# Patient Record
Sex: MALE | Race: OTHER | HISPANIC OR LATINO
[De-identification: names, ages, dates, MRNs, and addresses within clinical notes are randomized per-mention and may not be internally consistent; named-entity substitution may affect disease eponyms.]

---

## 2024-04-17 PROBLEM — Z00.00 ENCOUNTER FOR PREVENTIVE HEALTH EXAMINATION: Status: ACTIVE | Noted: 2024-04-17

## 2024-05-03 ENCOUNTER — APPOINTMENT (OUTPATIENT)
Dept: VASCULAR SURGERY | Facility: CLINIC | Age: 44
End: 2024-05-03
Payer: COMMERCIAL

## 2024-05-03 ENCOUNTER — TRANSCRIPTION ENCOUNTER (OUTPATIENT)
Age: 44
End: 2024-05-03

## 2024-05-03 VITALS
HEIGHT: 68 IN | WEIGHT: 230 LBS | SYSTOLIC BLOOD PRESSURE: 137 MMHG | DIASTOLIC BLOOD PRESSURE: 85 MMHG | HEART RATE: 83 BPM | BODY MASS INDEX: 34.86 KG/M2

## 2024-05-03 DIAGNOSIS — E78.00 PURE HYPERCHOLESTEROLEMIA, UNSPECIFIED: ICD-10-CM

## 2024-05-03 DIAGNOSIS — E11.9 TYPE 2 DIABETES MELLITUS W/OUT COMPLICATIONS: ICD-10-CM

## 2024-05-03 DIAGNOSIS — I10 ESSENTIAL (PRIMARY) HYPERTENSION: ICD-10-CM

## 2024-05-03 DIAGNOSIS — Z78.9 OTHER SPECIFIED HEALTH STATUS: ICD-10-CM

## 2024-05-03 PROCEDURE — 93986 DUP-SCAN HEMO COMPL UNI STD: CPT

## 2024-05-03 PROCEDURE — 99204 OFFICE O/P NEW MOD 45 MIN: CPT | Mod: 25

## 2024-05-03 PROCEDURE — G0506: CPT

## 2024-05-05 NOTE — HISTORY OF PRESENT ILLNESS
[FreeTextEntry1] : Patient is a 43-year-old gentleman with past medical history significant for diabetes, hypertension, renal insufficiency, currently not on dialysis presenting to us for evaluation for permanent hemodialysis access.  Patient denies any history of coronary artery disease or smoking.  Patient is right-handed.  Patient will need dialysis in the near future within 3 to 6 months.

## 2024-05-05 NOTE — PHYSICAL EXAM
[Hand well perfused] : hand well perfused [Normal] : normoactive bowel sounds, soft and nontender, no hepatosplenomegaly or masses appreciated [2+] : left 2+

## 2024-05-05 NOTE — ASSESSMENT
[FreeTextEntry1] : Patient with renal insufficiency.  Plan for left radiocephalic fistula.  Patient needs medical clearance.  Risks benefits and alternative modes of treatment were all discussed with the patient in detail.  Including risk of infection bleeding and steal syndrome.

## 2024-05-08 ENCOUNTER — APPOINTMENT (OUTPATIENT)
Dept: TRANSPLANT | Facility: CLINIC | Age: 44
End: 2024-05-08
Payer: COMMERCIAL

## 2024-05-08 ENCOUNTER — NON-APPOINTMENT (OUTPATIENT)
Age: 44
End: 2024-05-08

## 2024-05-08 ENCOUNTER — APPOINTMENT (OUTPATIENT)
Dept: NEPHROLOGY | Facility: CLINIC | Age: 44
End: 2024-05-08
Payer: COMMERCIAL

## 2024-05-08 VITALS
WEIGHT: 234 LBS | DIASTOLIC BLOOD PRESSURE: 86 MMHG | HEART RATE: 84 BPM | TEMPERATURE: 98.3 F | OXYGEN SATURATION: 99 % | RESPIRATION RATE: 16 BRPM | SYSTOLIC BLOOD PRESSURE: 141 MMHG | BODY MASS INDEX: 35.58 KG/M2

## 2024-05-08 VITALS
BODY MASS INDEX: 35.46 KG/M2 | HEART RATE: 84 BPM | WEIGHT: 234 LBS | SYSTOLIC BLOOD PRESSURE: 141 MMHG | DIASTOLIC BLOOD PRESSURE: 86 MMHG | OXYGEN SATURATION: 99 % | TEMPERATURE: 98.3 F | HEIGHT: 68 IN | RESPIRATION RATE: 16 BRPM

## 2024-05-08 DIAGNOSIS — N18.5 CHRONIC KIDNEY DISEASE, STAGE 5: ICD-10-CM

## 2024-05-08 DIAGNOSIS — Z79.4 TYPE 2 DIABETES MELLITUS WITH DIABETIC NEPHROPATHY: ICD-10-CM

## 2024-05-08 DIAGNOSIS — N18.6 HYPERTENSIVE CHRONIC KIDNEY DISEASE WITH STAGE 5 CHRONIC KIDNEY DISEASE OR END STAGE RENAL DISEASE: ICD-10-CM

## 2024-05-08 DIAGNOSIS — N18.9 CHRONIC KIDNEY DISEASE, UNSPECIFIED: ICD-10-CM

## 2024-05-08 DIAGNOSIS — E11.21 TYPE 2 DIABETES MELLITUS WITH DIABETIC NEPHROPATHY: ICD-10-CM

## 2024-05-08 DIAGNOSIS — I12.0 HYPERTENSIVE CHRONIC KIDNEY DISEASE WITH STAGE 5 CHRONIC KIDNEY DISEASE OR END STAGE RENAL DISEASE: ICD-10-CM

## 2024-05-08 PROCEDURE — 99204 OFFICE O/P NEW MOD 45 MIN: CPT

## 2024-05-08 PROCEDURE — 99205 OFFICE O/P NEW HI 60 MIN: CPT

## 2024-05-08 RX ORDER — FUROSEMIDE 80 MG/1
80 TABLET ORAL DAILY
Refills: 0 | Status: ACTIVE | COMMUNITY

## 2024-05-08 RX ORDER — DULAGLUTIDE 0.75 MG/.5ML
0.75 INJECTION, SOLUTION SUBCUTANEOUS WEEKLY
Refills: 0 | Status: ACTIVE | COMMUNITY

## 2024-05-08 RX ORDER — INSULIN DEGLUDEC INJECTION 100 U/ML
INJECTION, SOLUTION SUBCUTANEOUS DAILY
Refills: 0 | Status: ACTIVE | COMMUNITY

## 2024-05-08 RX ORDER — CALCITRIOL 0.5 UG/1
0.5 CAPSULE, LIQUID FILLED ORAL DAILY
Refills: 0 | Status: ACTIVE | COMMUNITY

## 2024-05-08 RX ORDER — SEVELAMER HYDROCHLORIDE 800 MG/1
800 TABLET, FILM COATED ORAL DAILY
Refills: 0 | Status: ACTIVE | COMMUNITY

## 2024-05-08 RX ORDER — SODIUM ZIRCONIUM CYCLOSILICATE 10 G/10G
10 POWDER, FOR SUSPENSION ORAL DAILY
Refills: 0 | Status: ACTIVE | COMMUNITY

## 2024-05-08 RX ORDER — INSULIN LISPRO 100 [IU]/ML
INJECTION, SOLUTION INTRAVENOUS; SUBCUTANEOUS
Refills: 0 | Status: ACTIVE | COMMUNITY

## 2024-05-08 RX ORDER — ENALAPRIL MALEATE 20 MG/1
20 TABLET ORAL DAILY
Refills: 0 | Status: ACTIVE | COMMUNITY

## 2024-05-08 RX ORDER — SODIUM BICARBONATE 650 MG/1
TABLET ORAL 3 TIMES DAILY
Refills: 0 | Status: ACTIVE | COMMUNITY

## 2024-05-08 RX ORDER — ATORVASTATIN CALCIUM 80 MG/1
80 TABLET, FILM COATED ORAL
Refills: 0 | Status: ACTIVE | COMMUNITY

## 2024-05-08 RX ORDER — CALCIUM ACETATE 667 MG/1
667 CAPSULE ORAL 3 TIMES DAILY
Refills: 0 | Status: ACTIVE | COMMUNITY

## 2024-05-08 NOTE — ASSESSMENT
[FreeTextEntry1] : .Mr. TUCKER 43 year He is evaluated for kidney transplantation. Pre transplant/CKD: Patient will benefit from renal allotransplantation once confirmed to be a candidate after full evaluation that includes review of reports of risk evaluation noted below. Patient  is an acceptable candidate clinically with the following risks needing full evaluation before transplant candidacy can be confirmed: Cardiovascular, cancer screening, Infectious disease screening, Vascular/urinary tract anatomy, Psychosocial factors. Diabetes Mellitus: Discussed implications. Continue follow up with primary physicians Hypertension: Discussed implications. Continue follow up with primary physicians. Cardiac risk:  will get further evaluation; echo, stress test; Reviewed cardiovascular risk evaluation process Cancer screening:  Reviewed for any h/o neoplastic diseases. Appropriate cancer screening include: PSA/Colon leon screening. ID: Serology for acute and chronic viral infections/immunity/screening for latent TB  Imaging: Renal/abdominal /chest /Iliac/ carotids imaging Consults: Nutrition, social work, cardiology, Transplant surgery. Reviewed factors affecting survival and morbidity while on wait list and reviewed raegan-operative and long-term risk factors affecting outcome in kidney transplantation. Details of transplant surgery, immunosuppression and its complications and benefits of live donor transplantation as well as variability in wait times across regions and multiple listing were discussed. KDPI >85% and PHS high risk criteria donors were discussed. Discussed factors affecting morbidity and mortality while on hemodialysis. Current outcome for [facility] kidney transplant program and SRTR data were discussed. He has informative educational video and power point presentation regarding details of kidney transplantation at this center. Patient has potential live donor (none ) at present.  Will proceed with completing/ updating work up and listing for transplant/ live donor transplant once work up is reviewed and found to be ok. Copy of Consult/Note sent to referring nephrologist/Primary provider. Any work up obtained for transplant evaluation to be sent to patient's team of care providers as appropriate.

## 2024-05-08 NOTE — HISTORY OF PRESENT ILLNESS
[FreeTextEntry1] : 43  years old  male, born in [facility] , living in  since    Patient has known CKD  (),    on follow up with Dr. Almeida is here for pre kidney transplant evaluation. Kidney disease is known to be from: HTN, DM type 2 Kidney Biopsy if available is reviewed. None   Patient is a preemptive candidate. Patient is accompanied by mother- Soniya today.   MEDICAL Hx:   Patient has known DM (age  33) On insulin (age 38); HTN (age 44). Reviewed for h/o Hyperlipidemia/ Gout Reviewed for history of CAD/PCI/Pacemaker/AICD- none Reviewed for h/o chronic liver/lung disease-none No known h/o kidney stone/ Prostatism/urinary obstruction/ ureter stents/hematuria. No Transfusions Urine out put: Nocturia X3-4 times No h/o Sleep apnea. No h/o Thyroid disease. No h/o NSAID/pain medication use. No major weight loss/weight loss surgeries Has no h/o Pneumonia / UTI/ known h/o tuberculosis or hepatitis/active infections/open wounds. No known h/o active CAD/CVA/PVD/DVT/bleeding/falls/seizures/psych issues/neoplasia   COVID/COVID vaccination: 4 times COVID, recovered at home; Vaccinated   Most recent hospitalization/for: None recent   SURGICAL Hx: Appendectomy  Circumcision in  Penile implant   No history of kidney/ bladder/ prostate surgery.   PERSONAL/FAMILY Hx: Non smoker. Family: Lives with: 15 years old Niece and mother Care giver post op: Parents are . Father -    Mother- Siblings- 1 brother in [place] healthy. Children: 1 13 years old daughter, lives in [place] Healthy. No family history of kidney disease Independent for ADL Able to walk ten blocks, can climb stairs without difficulty. Driving: Ok ROS: Has h/o shortness of breath on exertion. Vision: Ok Hearing: Ok Functional/employment status:  at [employer]  Potential Live donors: None   Prior Studies: Cardiology: none Cancer Screen: none Primary MD: Ej owen Nephrologist: Dr. Almeida     Prior Transplant evaluation: none prior Allergies: NKDA Medications: Does not take any Coumadin/eliquis/Plavix/Brilinta. Enalapril Atorvastatin 80/d Sod bicarb Furosemide 80/d Sevelamer 800/d Calcitriol 0.5 mcg/d Carvedilol 25/d Lokelma 10g/d Ca acetate Trulicity 0.75/week Tresiba  Humalog   Available lab data:  3/21 Hb 8.7 creatinine 6.64 eGFR 10 Na 139 K 5.1 CO2-16 BUN 75 Ca 7.9

## 2024-05-08 NOTE — PHYSICAL EXAM
[General Appearance - Alert] : alert [General Appearance - In No Acute Distress] : in no acute distress [Sclera] : the sclera and conjunctiva were normal [Outer Ear] : the ears and nose were normal in appearance [Jugular Venous Distention Increased] : there was no jugular-venous distention [Auscultation Breath Sounds / Voice Sounds] : lungs were clear to auscultation bilaterally [Heart Sounds Gallop] : no gallops [Heart Sounds Pericardial Friction Rub] : no pericardial rub [Edema] : there was no peripheral edema [Abdomen Soft] : soft [Abdomen Tenderness] : non-tender [Cervical Lymph Nodes Enlarged Posterior Bilaterally] : posterior cervical [Cervical Lymph Nodes Enlarged Anterior Bilaterally] : anterior cervical [Involuntary Movements] : no involuntary movements were seen [] : no rash [No Focal Deficits] : no focal deficits [Oriented To Time, Place, And Person] : oriented to person, place, and time [Impaired Insight] : insight and judgment were intact

## 2024-05-09 LAB
ABO + RH PNL BLD: NORMAL
ABO + RH PNL BLD: NORMAL
ALBUMIN SERPL ELPH-MCNC: 3.7 G/DL
ALP BLD-CCNC: 76 U/L
ALT SERPL-CCNC: 15 U/L
ANION GAP SERPL CALC-SCNC: 14 MMOL/L
APPEARANCE: CLEAR
AST SERPL-CCNC: 18 U/L
BACTERIA: NEGATIVE /HPF
BASOPHILS # BLD AUTO: 0.04 K/UL
BASOPHILS NFR BLD AUTO: 0.7 %
BILIRUB SERPL-MCNC: 0.2 MG/DL
BILIRUBIN URINE: NEGATIVE
BLOOD URINE: ABNORMAL
BUN SERPL-MCNC: 89 MG/DL
C PEPTIDE SERPL-MCNC: 12 NG/ML
CALCIUM SERPL-MCNC: 8.6 MG/DL
CAST: 0 /LPF
CHLORIDE SERPL-SCNC: 105 MMOL/L
CHOLEST SERPL-MCNC: 160 MG/DL
CMV IGG SERPL QL: 2.5 U/ML
CMV IGG SERPL-IMP: POSITIVE
CO2 SERPL-SCNC: 17 MMOL/L
COLOR: YELLOW
COVID-19 NUCLEOCAPSID  GAM ANTIBODY INTERPRETATION: POSITIVE
COVID-19 SPIKE DOMAIN ANTIBODY INTERPRETATION: POSITIVE
CREAT SERPL-MCNC: 8.45 MG/DL
CREAT SPEC-SCNC: 59 MG/DL
CREAT/PROT UR: 9.9 RATIO
EBV EA AB SER IA-ACNC: <5 U/ML
EBV EA AB TITR SER IF: POSITIVE
EBV EA IGG SER QL IA: 66.5 U/ML
EBV EA IGG SER-ACNC: NEGATIVE
EBV EA IGM SER IA-ACNC: NEGATIVE
EBV PATRN SPEC IB-IMP: NORMAL
EBV VCA IGG SER IA-ACNC: >750 U/ML
EBV VCA IGM SER QL IA: <10 U/ML
EGFR: 7 ML/MIN/1.73M2
EOSINOPHIL # BLD AUTO: 0.48 K/UL
EOSINOPHIL NFR BLD AUTO: 8 %
EPITHELIAL CELLS: 1 /HPF
EPSTEIN-BARR VIRUS CAPSID ANTIGEN IGG: POSITIVE
ESTIMATED AVERAGE GLUCOSE: 160 MG/DL
GLUCOSE QUALITATIVE U: 100 MG/DL
GLUCOSE SERPL-MCNC: 101 MG/DL
HAV IGM SER QL: NONREACTIVE
HBA1C MFR BLD HPLC: 7.2 %
HBV CORE IGG+IGM SER QL: NONREACTIVE
HBV SURFACE AB SER QL: REACTIVE
HBV SURFACE AB SERPL IA-ACNC: 153.4 MIU/ML
HBV SURFACE AG SER QL: NONREACTIVE
HCT VFR BLD CALC: 28.4 %
HCV AB SER QL: NONREACTIVE
HCV S/CO RATIO: 0.06 S/CO
HDLC SERPL-MCNC: 37 MG/DL
HEPATITIS A IGG ANTIBODY: REACTIVE
HGB BLD-MCNC: 9.1 G/DL
HIV1+2 AB SPEC QL IA.RAPID: NONREACTIVE
HSV 1+2 IGG SER IA-IMP: NEGATIVE
HSV 1+2 IGG SER IA-IMP: POSITIVE
HSV1 IGG SER QL: 36.2 INDEX
HSV2 IGG SER QL: 0.24 INDEX
IMM GRANULOCYTES NFR BLD AUTO: 0.2 %
KETONES URINE: NEGATIVE MG/DL
LDLC SERPL CALC-MCNC: 77 MG/DL
LEUKOCYTE ESTERASE URINE: NEGATIVE
LYMPHOCYTES # BLD AUTO: 1.85 K/UL
LYMPHOCYTES NFR BLD AUTO: 30.9 %
MAGNESIUM SERPL-MCNC: 2.3 MG/DL
MAN DIFF?: NORMAL
MCHC RBC-ENTMCNC: 28.3 PG
MCHC RBC-ENTMCNC: 32 GM/DL
MCV RBC AUTO: 88.2 FL
MICROSCOPIC-UA: NORMAL
MONOCYTES # BLD AUTO: 0.44 K/UL
MONOCYTES NFR BLD AUTO: 7.4 %
NEUTROPHILS # BLD AUTO: 3.16 K/UL
NEUTROPHILS NFR BLD AUTO: 52.8 %
NITRITE URINE: NEGATIVE
NONHDLC SERPL-MCNC: 122 MG/DL
PH URINE: 6.5
PHOSPHATE SERPL-MCNC: 6.7 MG/DL
PLATELET # BLD AUTO: 219 K/UL
POTASSIUM SERPL-SCNC: 5.4 MMOL/L
PROT SERPL-MCNC: 6.4 G/DL
PROT UR-MCNC: 582 MG/DL
PROTEIN URINE: 300 MG/DL
PSA SERPL-MCNC: 0.68 NG/ML
RBC # BLD: 3.22 M/UL
RBC # FLD: 13.7 %
RED BLOOD CELLS URINE: 2 /HPF
RUBV IGG FLD-ACNC: 13.5 INDEX
RUBV IGG SER-IMP: POSITIVE
SARS-COV-2 AB SERPL IA-ACNC: >250 U/ML
SARS-COV-2 AB SERPL QL IA: 186 INDEX
SARS-COV-2 N GENE NPH QL NAA+PROBE: NOT DETECTED
SODIUM SERPL-SCNC: 135 MMOL/L
SPECIFIC GRAVITY URINE: 1.01
T GONDII AB SER-IMP: POSITIVE
T GONDII IGG SER QL: 16.4 IU/ML
T PALLIDUM AB SER QL IA: NEGATIVE
TRIGL SERPL-MCNC: 280 MG/DL
UROBILINOGEN URINE: 0.2 MG/DL
VZV AB TITR SER: NEGATIVE
VZV IGG SER IF-ACNC: 106.5 INDEX
WBC # FLD AUTO: 5.98 K/UL
WHITE BLOOD CELLS URINE: 1 /HPF

## 2024-05-12 ENCOUNTER — NON-APPOINTMENT (OUTPATIENT)
Age: 44
End: 2024-05-12

## 2024-05-14 ENCOUNTER — NON-APPOINTMENT (OUTPATIENT)
Age: 44
End: 2024-05-14

## 2024-05-14 LAB
M TB IFN-G BLD-IMP: POSITIVE
QUANTIFERON TB PLUS MITOGEN MINUS NIL: >10 IU/ML
QUANTIFERON TB PLUS NIL: 0.01 IU/ML
QUANTIFERON TB PLUS TB1 MINUS NIL: 9.37 IU/ML
QUANTIFERON TB PLUS TB2 MINUS NIL: >10 IU/ML

## 2024-05-20 ENCOUNTER — APPOINTMENT (OUTPATIENT)
Dept: CT IMAGING | Facility: CLINIC | Age: 44
End: 2024-05-20
Payer: COMMERCIAL

## 2024-05-20 ENCOUNTER — APPOINTMENT (OUTPATIENT)
Dept: RADIOLOGY | Facility: CLINIC | Age: 44
End: 2024-05-20
Payer: COMMERCIAL

## 2024-05-20 PROCEDURE — 71046 X-RAY EXAM CHEST 2 VIEWS: CPT

## 2024-05-20 PROCEDURE — 74176 CT ABD & PELVIS W/O CONTRAST: CPT

## 2024-05-21 PROBLEM — Z87.448 HISTORY OF END STAGE RENAL DISEASE: Status: RESOLVED | Noted: 2024-05-21 | Resolved: 2024-05-21

## 2024-05-22 ENCOUNTER — APPOINTMENT (OUTPATIENT)
Dept: ENDOVASCULAR SURGERY | Facility: CLINIC | Age: 44
End: 2024-05-22
Payer: COMMERCIAL

## 2024-05-22 ENCOUNTER — RESULT REVIEW (OUTPATIENT)
Age: 44
End: 2024-05-22

## 2024-05-22 VITALS
OXYGEN SATURATION: 100 % | WEIGHT: 230 LBS | RESPIRATION RATE: 18 BRPM | BODY MASS INDEX: 34.86 KG/M2 | DIASTOLIC BLOOD PRESSURE: 79 MMHG | HEIGHT: 68 IN | HEART RATE: 80 BPM | TEMPERATURE: 97.9 F | SYSTOLIC BLOOD PRESSURE: 142 MMHG

## 2024-05-22 DIAGNOSIS — Z87.448 PERSONAL HISTORY OF OTHER DISEASES OF URINARY SYSTEM: ICD-10-CM

## 2024-05-22 PROCEDURE — 36558 INSERT TUNNELED CV CATH: CPT

## 2024-05-22 PROCEDURE — 76937 US GUIDE VASCULAR ACCESS: CPT

## 2024-05-22 PROCEDURE — 99213 OFFICE O/P EST LOW 20 MIN: CPT | Mod: 25

## 2024-05-22 PROCEDURE — 77001 FLUOROGUIDE FOR VEIN DEVICE: CPT

## 2024-05-24 ENCOUNTER — OUTPATIENT (OUTPATIENT)
Dept: OUTPATIENT SERVICES | Facility: HOSPITAL | Age: 44
LOS: 1 days | End: 2024-05-24
Payer: COMMERCIAL

## 2024-05-24 VITALS — OXYGEN SATURATION: 100 % | WEIGHT: 240.08 LBS | TEMPERATURE: 98 F | RESPIRATION RATE: 18 BRPM | HEIGHT: 68 IN

## 2024-05-24 DIAGNOSIS — E78.5 HYPERLIPIDEMIA, UNSPECIFIED: ICD-10-CM

## 2024-05-24 DIAGNOSIS — I10 ESSENTIAL (PRIMARY) HYPERTENSION: ICD-10-CM

## 2024-05-24 DIAGNOSIS — I12.0 HYPERTENSIVE CHRONIC KIDNEY DISEASE WITH STAGE 5 CHRONIC KIDNEY DISEASE OR END STAGE RENAL DISEASE: ICD-10-CM

## 2024-05-24 DIAGNOSIS — Z99.2 DEPENDENCE ON RENAL DIALYSIS: Chronic | ICD-10-CM

## 2024-05-24 DIAGNOSIS — N18.6 END STAGE RENAL DISEASE: ICD-10-CM

## 2024-05-24 DIAGNOSIS — Z96.0 PRESENCE OF UROGENITAL IMPLANTS: Chronic | ICD-10-CM

## 2024-05-24 DIAGNOSIS — Z98.890 OTHER SPECIFIED POSTPROCEDURAL STATES: Chronic | ICD-10-CM

## 2024-05-24 DIAGNOSIS — Z90.49 ACQUIRED ABSENCE OF OTHER SPECIFIED PARTS OF DIGESTIVE TRACT: Chronic | ICD-10-CM

## 2024-05-24 DIAGNOSIS — E11.9 TYPE 2 DIABETES MELLITUS WITHOUT COMPLICATIONS: ICD-10-CM

## 2024-05-24 DIAGNOSIS — Z01.818 ENCOUNTER FOR OTHER PREPROCEDURAL EXAMINATION: ICD-10-CM

## 2024-05-24 NOTE — H&P PST ADULT - NSICDXPASTSURGICALHX_GEN_ALL_CORE_FT
PAST SURGICAL HISTORY:  History of appendectomy     History of circumcision     History of penile implant     Status post insertion of hemodialysis catheter

## 2024-05-24 NOTE — H&P PST ADULT - ASSESSMENT
Scheduled for left radiocephalic fistula creation on 6/6/24 44 y/o male with history of hypertension, diabetes, hyperlipidemia, latent tuberculosis, BMI 36, and ESRD (dx 11/2023) on hemodialysis since 05/20/2024 via right anterior chest wall vascath (Mon/Wed/Fri)  is scheduled for left radiocephalic fistula creation on 6/6/24    STOP BANG SCORE IS 3

## 2024-05-24 NOTE — H&P PST ADULT - PROBLEM SELECTOR PLAN 3
Take antihypertensive medications the morning of surgery with a sip of water  Follow up with PCP postoperatively for management of hypertension

## 2024-05-24 NOTE — H&P PST ADULT - PROBLEM SELECTOR PLAN 1
Scheduled for left radiocephalic fistula creation on 6/6/24  Preoperative instructions discussed and given to patient.   Patient agrees to follow up with surgeon's office for instructions prior to surgery   Discussed preprocedure skin preparation using  chlorhexidine gluconate 4% solution three days prior to  surgery - including the day of surgery  Instructed patient to avoid aspirin and aspirin products, over the counter medications such as vitamins and herbal medications, one week prior to surgery.  Take Tylenol as needed for pain  Follow up with PCP postoperatively for management of chronic conditions  Patient verbalized understanding of instructions Scheduled for left radiocephalic fistula creation on 6/6/24  Preoperative instructions discussed and given to patient.   Patient agrees to follow up with surgeon's office for instructions prior to surgery   Discussed preprocedure skin preparation using  chlorhexidine gluconate 4% solution three days prior to  surgery - including the day of surgery  Instructed patient to avoid aspirin and aspirin products, over the counter medications such as vitamins and herbal medications, one week prior to surgery.  Take Tylenol as needed for pain  Follow up with PCP postoperatively for management of chronic conditions  Patient verbalized understanding of instructions  Complete hemodialysis the day before surgery  MRSA/MSSA - here today  A1C on chart Scheduled for left radiocephalic fistula creation on 6/6/24  Preoperative instructions discussed and given to patient.   Patient agrees to follow up with surgeon's office for instructions prior to surgery   Discussed preprocedure skin preparation using  chlorhexidine gluconate 4% solution three days prior to  surgery - including the day of surgery  Instructed patient to avoid aspirin and aspirin products, over the counter medications such as vitamins and herbal medications, one week prior to surgery.  Take Tylenol as needed for pain  Follow up with PCP postoperatively for management of chronic conditions  Patient verbalized understanding of instructions  Complete hemodialysis the day before surgery  MRSA/MSSA - here today  A1C on chart  Labs/ekg/mc/cc pending in PST - pt has scheduled appointment prior to surgery and plans to keep appts as scheduled

## 2024-05-24 NOTE — H&P PST ADULT - HISTORY OF PRESENT ILLNESS
44 y/o male with history of hypertension, diabetes, hyperlipidemia, latent tuberculosis, and ESRD (dx 11/2023) on hemodialysis since 05/20/2024 via right anterior chest wall vascath (Mon/Wed/Fri) presents for presurgical evaluation. He is scheduled for left radiocephalic fistula creation on 6/6/24

## 2024-05-24 NOTE — H&P PST ADULT - NSICDXPASTMEDICALHX_GEN_ALL_CORE_FT
PAST MEDICAL HISTORY:  End-stage renal disease (ESRD)     GERD (gastroesophageal reflux disease)     HLD (hyperlipidemia)     Hypertension     Type 2 diabetes mellitus      PAST MEDICAL HISTORY:  End-stage renal disease (ESRD)     GERD (gastroesophageal reflux disease)     HLD (hyperlipidemia)     Hypertension     TB lung, latent     Type 2 diabetes mellitus

## 2024-05-24 NOTE — H&P PST ADULT - PROBLEM SELECTOR PLAN 4
Hold trulicity 1 week prior to surgery (5/25/24)  A1C 7.2%  POC Glucose the morning of surgery  Continue tresiba as discussed  Hold humalog the morning of surgery

## 2024-05-24 NOTE — H&P PST ADULT - RESPIRATORY
normal/clear to auscultation bilaterally/no wheezes/no rales/no rhonchi/no use of accessory muscles/no subcutaneous emphysema/airway patent/respirations non-labored

## 2024-05-24 NOTE — H&P PST ADULT - NSICDXFAMILYHX_GEN_ALL_CORE_FT
FAMILY HISTORY:  Father  Still living? No  FH: kidney disease, Age at diagnosis: Age Unknown  FH: type 2 diabetes, Age at diagnosis: Age Unknown  FHx: hypertension, Age at diagnosis: Age Unknown

## 2024-05-24 NOTE — H&P PST ADULT - GASTROINTESTINAL
soft/nontender/nondistended/normal active bowel sounds/no guarding/no rigidity/no organomegaly/no palpable cam/no masses palpable details…

## 2024-05-25 ENCOUNTER — LABORATORY RESULT (OUTPATIENT)
Age: 44
End: 2024-05-25

## 2024-05-25 LAB
MRSA PCR RESULT.: SIGNIFICANT CHANGE UP
S AUREUS DNA NOSE QL NAA+PROBE: SIGNIFICANT CHANGE UP

## 2024-05-28 PROCEDURE — 87640 STAPH A DNA AMP PROBE: CPT

## 2024-05-28 PROCEDURE — 87641 MR-STAPH DNA AMP PROBE: CPT

## 2024-05-28 PROCEDURE — G0463: CPT

## 2024-05-29 ENCOUNTER — NON-APPOINTMENT (OUTPATIENT)
Age: 44
End: 2024-05-29

## 2024-05-29 ENCOUNTER — APPOINTMENT (OUTPATIENT)
Dept: CARDIOLOGY | Facility: CLINIC | Age: 44
End: 2024-05-29
Payer: COMMERCIAL

## 2024-05-29 VITALS
OXYGEN SATURATION: 97 % | BODY MASS INDEX: 35.46 KG/M2 | HEART RATE: 92 BPM | WEIGHT: 234 LBS | SYSTOLIC BLOOD PRESSURE: 138 MMHG | HEIGHT: 68 IN | DIASTOLIC BLOOD PRESSURE: 80 MMHG

## 2024-05-29 DIAGNOSIS — Z01.818 ENCOUNTER FOR OTHER PREPROCEDURAL EXAMINATION: ICD-10-CM

## 2024-05-29 DIAGNOSIS — I10 ESSENTIAL (PRIMARY) HYPERTENSION: ICD-10-CM

## 2024-05-29 PROCEDURE — 99204 OFFICE O/P NEW MOD 45 MIN: CPT

## 2024-05-29 PROCEDURE — G2211 COMPLEX E/M VISIT ADD ON: CPT

## 2024-05-29 PROCEDURE — 93000 ELECTROCARDIOGRAM COMPLETE: CPT | Mod: NC

## 2024-05-29 RX ORDER — CARVEDILOL 25 MG/1
25 TABLET, FILM COATED ORAL
Qty: 60 | Refills: 2 | Status: ACTIVE | COMMUNITY

## 2024-05-30 PROBLEM — E78.5 HYPERLIPIDEMIA, UNSPECIFIED: Chronic | Status: ACTIVE | Noted: 2024-05-24

## 2024-05-30 PROBLEM — Z22.7 LATENT TUBERCULOSIS: Chronic | Status: ACTIVE | Noted: 2024-05-24

## 2024-05-30 PROBLEM — K21.9 GASTRO-ESOPHAGEAL REFLUX DISEASE WITHOUT ESOPHAGITIS: Chronic | Status: ACTIVE | Noted: 2024-05-24

## 2024-05-30 PROBLEM — N18.6 END STAGE RENAL DISEASE: Chronic | Status: ACTIVE | Noted: 2024-05-24

## 2024-05-30 PROBLEM — I10 ESSENTIAL (PRIMARY) HYPERTENSION: Chronic | Status: ACTIVE | Noted: 2024-05-24

## 2024-05-30 PROBLEM — E11.9 TYPE 2 DIABETES MELLITUS WITHOUT COMPLICATIONS: Chronic | Status: ACTIVE | Noted: 2024-05-24

## 2024-05-30 NOTE — REASON FOR VISIT
[FreeTextEntry1] : needs left radiocephalic fistula. 2024 Surgery with Zach Alonso.   no biopsy, on HD - at [facility] via left chest catheter.   DM2, diagnosed 13 years ago HTN diagnosed 3 years ago HLD never a smoker  Surgical history includes appendectomy and circumcision, penile implant.  His father is . He had ESRD with renal transplant,  during Covid.  His mother is alive and well.  He has one brother. He has 2 half-sisters.  Born in [facility].  Single. He has one daughter, age 14.  He works at a car dealership.  For exercise he uses a stationary bicycle for one hour 3 times a week.  No chest discomfort, shortness of breath, palpitations, lightheadedness or syncope.      [Other: ____] : [unfilled] [Other: ______] : provided by [employer]ENVEN[employer]O

## 2024-05-31 NOTE — PAST MEDICAL HISTORY
[Increasing age ( >40 years old)] : Increasing age ( >40 years old) [No therapy indicated for cases scheduled for less than one hour] : No therapy indicated for cases scheduled for less than one hour. [FreeTextEntry1] : Malignant Hyperthermis (MH) Screening Tool and Risk of Bleeding Assessement Mr. FE TUCKER  denies family history of unexpected death following Anesthesia or Exercise. Denies Family history of Malignant Hyperthermia, Muscle or Neuromuscular disorder and High Temperature following exercise.  Mr. FE TUCKER denies history of Muscle Spasm, Dark or Chocolate - Colored urine and Unanticipated fever immediately following anesthesia or serious exercise.  Mr. TUCKER  also denies bleeding tendencies/ Risks of Bleeding

## 2024-05-31 NOTE — HISTORY OF PRESENT ILLNESS
[FreeTextEntry1] : alert and oriented accompanied by mother Hayley [phone] took enalapril and carvedilol this morning  BS having fistula creationon 6/6/24 [FreeTextEntry4] : starting dialysis next week  [FreeTextEntry5] : yesterday 7pm  [FreeTextEntry6] : Dr Hall

## 2024-05-31 NOTE — PROCEDURE
[D/C IV on discharge] : D/C IV on discharge [Resume diet] : resume diet [Site check for bleeding/hematoma] : Site check for bleeding/hematoma [Vital signs on admission the q 15 mins x2] : Vital signs on admission the q 15 mins x2 [FreeTextEntry1] : Right chest tunneled catheter placement

## 2024-06-02 NOTE — DISCUSSION/SUMMARY
[EKG obtained to assist in diagnosis and management of assessed problem(s)] : EKG obtained to assist in diagnosis and management of assessed problem(s) [FreeTextEntry1] : He is a 43-year-old who presents today for pretransplant cardiac evaluation prior to potential renal transplant with known cardiac risk factors as above.   His blood pressure is well controlled on carvedilol, enalapril and furosemide. Most recent lipid profile: , total cholesterol 160, HDL 37, LDL 77 mg/dL. Continue atorvastatin.  Recommend follow up with endocrinology for improved glycemic control in patient awaiting potential transplant.   He will schedule an echocardiogram for structural heart evaluation.  A nuclear stress test will evaluate for any evidence of inducible ischemia.  These tests are not necessary prior to planned AV fistula creation. He is considered optimized and may proceed with vascular intervention as scheduled.   Follow up pending results.

## 2024-06-02 NOTE — END OF VISIT
[FreeTextEntry3] : I, Lamine Weaver MD, personally performed the evaluation and management (E/M) services for this new patient. That E/M includes conducting the clinically appropriate initial history &/or exam, assessing all conditions, and establishing the plan of care. Today, Teresa Goodwin NP was here to observe my evaluation and management service for this patient & follow plan of care established by me going forward. [Time Spent: ___ minutes] : I have spent [unfilled] minutes of time on the encounter.

## 2024-06-02 NOTE — HISTORY OF PRESENT ILLNESS
[FreeTextEntry1] : Justin Lo presents today for pretransplant cardiac evaluation prior to potential renal transplant. He is also here today for preoperative cardiovascular evaluation prior to planned left radio cephalic fistula on 2024 with Zach Alonso MD.   He is a 43-year-old, born in [facility], with known cardiac risk factors of chronic hypertension, dyslipidemia, and insulin dependent diabetes mellitus (diagnosed 13 years ago, A1c 7.2%) and end stage renal disease on hemodialysis (---S at [facility] via left chest catheter, now pending AV fistula creation on 2024).   His surgical history includes appendectomy, penile implant.   His father is . He had ESRD with renal transplant,  during Covid.  His mother is alive and well.  He has one brother. He has 2 half-sisters.  Single. He has one daughter, age 14.  He works at a car dealership.  For exercise he uses a stationary bicycle for one hour 3 times a week.  No chest discomfort, shortness of breath, palpitations, lightheadedness or syncope.

## 2024-06-02 NOTE — CARDIOLOGY SUMMARY
[de-identified] : 5/29/2024 - sinus rhythm at 84 bpm, normal axis, nonspecific T-wave flattening, normal R-wave progression

## 2024-06-05 ENCOUNTER — TRANSCRIPTION ENCOUNTER (OUTPATIENT)
Age: 44
End: 2024-06-05

## 2024-06-05 ENCOUNTER — APPOINTMENT (OUTPATIENT)
Dept: ENDOVASCULAR SURGERY | Facility: CLINIC | Age: 44
End: 2024-06-05
Payer: COMMERCIAL

## 2024-06-05 PROCEDURE — 36593 DECLOT VASCULAR DEVICE: CPT

## 2024-06-06 ENCOUNTER — APPOINTMENT (OUTPATIENT)
Dept: VASCULAR SURGERY | Facility: HOSPITAL | Age: 44
End: 2024-06-06
Payer: COMMERCIAL

## 2024-06-06 ENCOUNTER — TRANSCRIPTION ENCOUNTER (OUTPATIENT)
Age: 44
End: 2024-06-06

## 2024-06-06 ENCOUNTER — OUTPATIENT (OUTPATIENT)
Dept: OUTPATIENT SERVICES | Facility: HOSPITAL | Age: 44
LOS: 1 days | End: 2024-06-06
Payer: COMMERCIAL

## 2024-06-06 VITALS
HEART RATE: 82 BPM | DIASTOLIC BLOOD PRESSURE: 75 MMHG | HEIGHT: 68 IN | RESPIRATION RATE: 16 BRPM | WEIGHT: 240.08 LBS | OXYGEN SATURATION: 98 % | SYSTOLIC BLOOD PRESSURE: 126 MMHG | TEMPERATURE: 98 F

## 2024-06-06 VITALS
DIASTOLIC BLOOD PRESSURE: 65 MMHG | HEART RATE: 75 BPM | RESPIRATION RATE: 17 BRPM | TEMPERATURE: 98 F | OXYGEN SATURATION: 95 % | SYSTOLIC BLOOD PRESSURE: 105 MMHG

## 2024-06-06 DIAGNOSIS — Z99.2 DEPENDENCE ON RENAL DIALYSIS: Chronic | ICD-10-CM

## 2024-06-06 DIAGNOSIS — Z96.0 PRESENCE OF UROGENITAL IMPLANTS: Chronic | ICD-10-CM

## 2024-06-06 DIAGNOSIS — Z98.890 OTHER SPECIFIED POSTPROCEDURAL STATES: Chronic | ICD-10-CM

## 2024-06-06 DIAGNOSIS — N18.6 END STAGE RENAL DISEASE: ICD-10-CM

## 2024-06-06 DIAGNOSIS — I12.0 HYPERTENSIVE CHRONIC KIDNEY DISEASE WITH STAGE 5 CHRONIC KIDNEY DISEASE OR END STAGE RENAL DISEASE: ICD-10-CM

## 2024-06-06 DIAGNOSIS — Z90.49 ACQUIRED ABSENCE OF OTHER SPECIFIED PARTS OF DIGESTIVE TRACT: Chronic | ICD-10-CM

## 2024-06-06 LAB
GLUCOSE BLDC GLUCOMTR-MCNC: 216 MG/DL — HIGH (ref 70–99)
GLUCOSE BLDC GLUCOMTR-MCNC: 280 MG/DL — HIGH (ref 70–99)
GLUCOSE BLDC GLUCOMTR-MCNC: 288 MG/DL — HIGH (ref 70–99)
POTASSIUM SERPL-MCNC: 4.1 MMOL/L — SIGNIFICANT CHANGE UP (ref 3.5–5.3)
POTASSIUM SERPL-SCNC: 4.1 MMOL/L — SIGNIFICANT CHANGE UP (ref 3.5–5.3)

## 2024-06-06 PROCEDURE — 36821 AV FUSION DIRECT ANY SITE: CPT | Mod: LT

## 2024-06-06 PROCEDURE — 36415 COLL VENOUS BLD VENIPUNCTURE: CPT

## 2024-06-06 PROCEDURE — 84132 ASSAY OF SERUM POTASSIUM: CPT

## 2024-06-06 PROCEDURE — C1889: CPT

## 2024-06-06 PROCEDURE — 82962 GLUCOSE BLOOD TEST: CPT

## 2024-06-06 PROCEDURE — 36820 AV FUSION/FOREARM VEIN: CPT

## 2024-06-06 DEVICE — SURGICEL FIBRILLAR 2 X 4": Type: IMPLANTABLE DEVICE | Status: FUNCTIONAL

## 2024-06-06 DEVICE — GELFOAM 12 X 7MM: Type: IMPLANTABLE DEVICE | Status: FUNCTIONAL

## 2024-06-06 DEVICE — CLIP APPLIER COVIDIEN SURGICLIP II 9.75" MEDIUM: Type: IMPLANTABLE DEVICE | Status: FUNCTIONAL

## 2024-06-06 DEVICE — CLIP APPLIER COVIDIEN SURGICLIP III 9" SM: Type: IMPLANTABLE DEVICE | Status: FUNCTIONAL

## 2024-06-06 RX ORDER — CALCITRIOL 0.5 UG/1
1 CAPSULE ORAL
Refills: 0 | DISCHARGE

## 2024-06-06 RX ORDER — DULAGLUTIDE 4.5 MG/.5ML
0.75 INJECTION, SOLUTION SUBCUTANEOUS
Refills: 0 | DISCHARGE

## 2024-06-06 RX ORDER — SODIUM CHLORIDE 9 MG/ML
1000 INJECTION, SOLUTION INTRAVENOUS
Refills: 0 | Status: DISCONTINUED | OUTPATIENT
Start: 2024-06-06 | End: 2024-06-06

## 2024-06-06 RX ORDER — SODIUM ZIRCONIUM CYCLOSILICATE 10 G/10G
10 POWDER, FOR SUSPENSION ORAL
Refills: 0 | DISCHARGE

## 2024-06-06 RX ORDER — OXYCODONE HYDROCHLORIDE 5 MG/1
5 TABLET ORAL ONCE
Refills: 0 | Status: DISCONTINUED | OUTPATIENT
Start: 2024-06-06 | End: 2024-06-06

## 2024-06-06 RX ORDER — SEVELAMER CARBONATE 2400 MG/1
1 POWDER, FOR SUSPENSION ORAL
Refills: 0 | DISCHARGE

## 2024-06-06 RX ORDER — SODIUM CHLORIDE 9 MG/ML
3 INJECTION INTRAMUSCULAR; INTRAVENOUS; SUBCUTANEOUS EVERY 8 HOURS
Refills: 0 | Status: DISCONTINUED | OUTPATIENT
Start: 2024-06-06 | End: 2024-06-06

## 2024-06-06 RX ORDER — INSULIN LISPRO 100/ML
0 VIAL (ML) SUBCUTANEOUS
Refills: 0 | DISCHARGE

## 2024-06-06 RX ORDER — CHLORHEXIDINE GLUCONATE 213 G/1000ML
1 SOLUTION TOPICAL DAILY
Refills: 0 | Status: DISCONTINUED | OUTPATIENT
Start: 2024-06-06 | End: 2024-06-06

## 2024-06-06 RX ORDER — CARVEDILOL PHOSPHATE 80 MG/1
1 CAPSULE, EXTENDED RELEASE ORAL
Refills: 0 | DISCHARGE

## 2024-06-06 RX ORDER — ATORVASTATIN CALCIUM 80 MG/1
1 TABLET, FILM COATED ORAL
Refills: 0 | DISCHARGE

## 2024-06-06 RX ORDER — FUROSEMIDE 40 MG
1 TABLET ORAL
Refills: 0 | DISCHARGE

## 2024-06-06 RX ORDER — CALCIUM ACETATE 667 MG
3 TABLET ORAL
Refills: 0 | DISCHARGE

## 2024-06-06 RX ORDER — OXYCODONE HYDROCHLORIDE 5 MG/1
1 TABLET ORAL
Qty: 12 | Refills: 0
Start: 2024-06-06 | End: 2024-06-08

## 2024-06-06 RX ORDER — INSULIN LISPRO 100/ML
4 VIAL (ML) SUBCUTANEOUS ONCE
Refills: 0 | Status: COMPLETED | OUTPATIENT
Start: 2024-06-06 | End: 2024-06-06

## 2024-06-06 RX ADMIN — SODIUM CHLORIDE 3 MILLILITER(S): 9 INJECTION INTRAMUSCULAR; INTRAVENOUS; SUBCUTANEOUS at 08:01

## 2024-06-06 RX ADMIN — CHLORHEXIDINE GLUCONATE 1 APPLICATION(S): 213 SOLUTION TOPICAL at 08:03

## 2024-06-06 RX ADMIN — Medication 4 UNIT(S): at 08:30

## 2024-06-06 NOTE — ASU DISCHARGE PLAN (ADULT/PEDIATRIC) - NS MD DC FALL RISK RISK
For information on Fall & Injury Prevention, visit: [email or web address] OR  [email or web address] OR  https://www.cdc.gov/steadi/patient.html

## 2024-06-06 NOTE — ASU DISCHARGE PLAN (ADULT/PEDIATRIC) - CARE PROVIDER_API CALL
Zach Alonso  Vascular Surgery  [address], [address]  [place] [address]  Phone: [phone]  Fax: [phone]  Established Patient  Follow Up Time: 2 weeks

## 2024-06-06 NOTE — ASU PATIENT PROFILE, ADULT - NSICDXPASTMEDICALHX_GEN_ALL_CORE_FT
PAST MEDICAL HISTORY:  End-stage renal disease (ESRD)     GERD (gastroesophageal reflux disease)     HLD (hyperlipidemia)     Hypertension     TB lung, latent     Type 2 diabetes mellitus

## 2024-06-06 NOTE — PROCEDURE
[Cathflo 2mg in each lumen of dialysis catheter to dwell until next dialysis] : Cathflo 2mg in each lumen of dialysis catheter to dwell until next dialysis

## 2024-06-06 NOTE — ASU DISCHARGE PLAN (ADULT/PEDIATRIC) - ASU DC SPECIAL INSTRUCTIONSFT
DIET  You may resume your regular diet as normal.     SURGICAL SITES  Remove outer dressing in 48 hours. You may shower 48 hours post-operatively but do not bathe or soak in the water for 1-2 weeks; pat dry. If you notice any signs of surgical site infection (ie. redness, swelling, pain, pus drainage), please seek medical care immediately.     ACTIVITY  Do not lift anything heavier than 10 pounds for 2 weeks and avoid strenuous activity for 4-6 weeks.     PAIN CONTROL  You may take Tylenol 650mg as needed for mild pain. Maximum daily dose of Tylenol should not exceed 4grams/day.   You may take your prescribed oxycodone for severe breakthrough pain not that is not relieved by Tylenol. Do not drive or make important decisions while taking this medication and do not take more than 4 pills in 24 hours.    APPOINTMENT  - Come to the Surgery Office in 2 weeks for a post operative check up.

## 2024-06-07 RX ORDER — OXYCODONE AND ACETAMINOPHEN 5; 325 MG/1; MG/1
5-325 TABLET ORAL
Qty: 10 | Refills: 0 | Status: ACTIVE | COMMUNITY
Start: 2024-06-07 | End: 1900-01-01

## 2024-06-14 ENCOUNTER — APPOINTMENT (OUTPATIENT)
Dept: VASCULAR SURGERY | Facility: CLINIC | Age: 44
End: 2024-06-14
Payer: COMMERCIAL

## 2024-06-14 VITALS
WEIGHT: 234 LBS | DIASTOLIC BLOOD PRESSURE: 67 MMHG | BODY MASS INDEX: 35.46 KG/M2 | SYSTOLIC BLOOD PRESSURE: 107 MMHG | HEIGHT: 68 IN | HEART RATE: 98 BPM

## 2024-06-14 PROCEDURE — 99024 POSTOP FOLLOW-UP VISIT: CPT

## 2024-06-14 PROCEDURE — 93990 DOPPLER FLOW TESTING: CPT

## 2024-06-14 NOTE — REASON FOR VISIT
[de-identified] : Patient with renal failure, status post left radiocephalic fistula.  Patient complaining of left thumb numbness.  Incision is clean.  Motor function is intact.  There is a thrill.  Sutures were removed.  Will schedule the patient for maturation in 2 weeks.

## 2024-06-17 ENCOUNTER — APPOINTMENT (OUTPATIENT)
Dept: ENDOVASCULAR SURGERY | Facility: CLINIC | Age: 44
End: 2024-06-17
Payer: COMMERCIAL

## 2024-06-17 PROCEDURE — 36593 DECLOT VASCULAR DEVICE: CPT

## 2024-06-17 PROCEDURE — 99213 OFFICE O/P EST LOW 20 MIN: CPT | Mod: 25

## 2024-06-18 NOTE — HISTORY OF PRESENT ILLNESS
[FreeTextEntry1] : Cathflo instilled into double lumen catheter to dwell until next dialysis session - referral from dialysis for malfunctioning

## 2024-06-24 ENCOUNTER — APPOINTMENT (OUTPATIENT)
Dept: VASCULAR SURGERY | Facility: CLINIC | Age: 44
End: 2024-06-24

## 2024-06-26 ENCOUNTER — APPOINTMENT (OUTPATIENT)
Dept: CARDIOLOGY | Facility: CLINIC | Age: 44
End: 2024-06-26
Payer: COMMERCIAL

## 2024-06-26 PROCEDURE — 93306 TTE W/DOPPLER COMPLETE: CPT

## 2024-06-27 NOTE — HISTORY OF PRESENT ILLNESS
[FreeTextEntry1] : referred from dialysis for malfunctioning tunneled catheter TPA 2mg instilled in each lumen to dwell until next dialysis

## 2024-07-02 PROBLEM — N18.6 CHRONIC KIDNEY DISEASE WITH END STAGE RENAL FAILURE ON DIALYSIS: Status: ACTIVE | Noted: 2024-05-05

## 2024-07-02 PROBLEM — T82.898A INADEQUATE FLOW OF DIALYSIS ARTERIOVENOUS FISTULA: Status: ACTIVE | Noted: 2024-07-02

## 2024-07-03 ENCOUNTER — APPOINTMENT (OUTPATIENT)
Dept: ENDOVASCULAR SURGERY | Facility: CLINIC | Age: 44
End: 2024-07-03
Payer: COMMERCIAL

## 2024-07-03 ENCOUNTER — RESULT REVIEW (OUTPATIENT)
Age: 44
End: 2024-07-03

## 2024-07-03 VITALS
OXYGEN SATURATION: 96 % | WEIGHT: 230 LBS | TEMPERATURE: 97.9 F | DIASTOLIC BLOOD PRESSURE: 75 MMHG | BODY MASS INDEX: 34.86 KG/M2 | HEART RATE: 85 BPM | RESPIRATION RATE: 18 BRPM | SYSTOLIC BLOOD PRESSURE: 125 MMHG | HEIGHT: 68 IN

## 2024-07-03 DIAGNOSIS — T82.898A OTHER SPECIFIED COMPLICATION OF VASCULAR PROSTHETIC DEVICES, IMPLANTS AND GRAFTS, INITIAL ENCOUNTER: ICD-10-CM

## 2024-07-03 PROCEDURE — 36902Z: CUSTOM | Mod: 58

## 2024-07-03 PROCEDURE — 76937 US GUIDE VASCULAR ACCESS: CPT

## 2024-07-03 PROCEDURE — 36011Z: CUSTOM | Mod: 59

## 2024-07-03 PROCEDURE — 36909Z: CUSTOM

## 2024-07-10 ENCOUNTER — APPOINTMENT (OUTPATIENT)
Dept: INFECTIOUS DISEASE | Facility: CLINIC | Age: 44
End: 2024-07-10
Payer: COMMERCIAL

## 2024-07-10 VITALS
DIASTOLIC BLOOD PRESSURE: 104 MMHG | WEIGHT: 234 LBS | SYSTOLIC BLOOD PRESSURE: 170 MMHG | TEMPERATURE: 98.3 F | RESPIRATION RATE: 18 BRPM | BODY MASS INDEX: 35.46 KG/M2 | HEART RATE: 81 BPM | OXYGEN SATURATION: 99 % | HEIGHT: 68 IN

## 2024-07-10 DIAGNOSIS — R76.12 NONSPECIFIC REACTION TO CELL MEDIATED IMMUNITY MEASUREMENT OF GAMMA INTERFERON ANTIGEN RESPONSE W/OUT ACTIVE TUBERCULOSIS: ICD-10-CM

## 2024-07-10 DIAGNOSIS — Z23 ENCOUNTER FOR IMMUNIZATION: ICD-10-CM

## 2024-07-10 LAB
BASOPHILS # BLD AUTO: 0.04 K/UL
BASOPHILS NFR BLD AUTO: 0.5 %
EOSINOPHIL # BLD AUTO: 0.4 K/UL
EOSINOPHIL NFR BLD AUTO: 5.4 %
HCT VFR BLD CALC: 30.1 %
HGB BLD-MCNC: 9.3 G/DL
IMM GRANULOCYTES NFR BLD AUTO: 0.8 %
LYMPHOCYTES # BLD AUTO: 1.84 K/UL
LYMPHOCYTES NFR BLD AUTO: 24.9 %
MAN DIFF?: NORMAL
MCHC RBC-ENTMCNC: 28 PG
MCHC RBC-ENTMCNC: 30.9 GM/DL
MCV RBC AUTO: 90.7 FL
MONOCYTES # BLD AUTO: 0.51 K/UL
MONOCYTES NFR BLD AUTO: 6.9 %
NEUTROPHILS # BLD AUTO: 4.54 K/UL
NEUTROPHILS NFR BLD AUTO: 61.5 %
PLATELET # BLD AUTO: 234 K/UL
RBC # BLD: 3.32 M/UL
RBC # FLD: 13.9 %
WBC # FLD AUTO: 7.39 K/UL

## 2024-07-10 PROCEDURE — 99215 OFFICE O/P EST HI 40 MIN: CPT

## 2024-07-11 LAB
ALBUMIN SERPL ELPH-MCNC: 3.8 G/DL
ALP BLD-CCNC: 89 U/L
ALT SERPL-CCNC: 19 U/L
ANION GAP SERPL CALC-SCNC: 15 MMOL/L
AST SERPL-CCNC: 17 U/L
BILIRUB SERPL-MCNC: 0.2 MG/DL
BUN SERPL-MCNC: 58 MG/DL
CALCIUM SERPL-MCNC: 9.4 MG/DL
CHLORIDE SERPL-SCNC: 100 MMOL/L
CREAT SERPL-MCNC: 7.52 MG/DL
EGFR: 8 ML/MIN/1.73M2
GLUCOSE SERPL-MCNC: 181 MG/DL
MEV IGG FLD QL IA: >300 AU/ML
MEV IGG+IGM SER-IMP: POSITIVE
MUV AB SER-ACNC: POSITIVE
MUV IGG SER QL IA: 55.4 AU/ML
POTASSIUM SERPL-SCNC: 5.1 MMOL/L
PROT SERPL-MCNC: 6.5 G/DL
RUBV IGG FLD-ACNC: >33 INDEX
RUBV IGG SER-IMP: POSITIVE
SODIUM SERPL-SCNC: 139 MMOL/L
VZV AB TITR SER: NEGATIVE
VZV IGG SER IF-ACNC: 53.08 INDEX

## 2024-07-12 ENCOUNTER — APPOINTMENT (OUTPATIENT)
Dept: VASCULAR SURGERY | Facility: CLINIC | Age: 44
End: 2024-07-12
Payer: COMMERCIAL

## 2024-07-12 VITALS
SYSTOLIC BLOOD PRESSURE: 150 MMHG | WEIGHT: 230 LBS | OXYGEN SATURATION: 96 % | HEART RATE: 82 BPM | HEIGHT: 68 IN | BODY MASS INDEX: 34.86 KG/M2 | DIASTOLIC BLOOD PRESSURE: 88 MMHG

## 2024-07-12 LAB — C IMMITIS AB SER QL IA: NEGATIVE

## 2024-07-12 PROCEDURE — 99024 POSTOP FOLLOW-UP VISIT: CPT

## 2024-07-13 LAB — T CRUZI AB SER-ACNC: 0.3 IV

## 2024-07-17 ENCOUNTER — APPOINTMENT (OUTPATIENT)
Dept: CARDIOLOGY | Facility: CLINIC | Age: 44
End: 2024-07-17
Payer: COMMERCIAL

## 2024-07-17 PROCEDURE — 93351 STRESS TTE COMPLETE: CPT

## 2024-07-18 DIAGNOSIS — Z01.818 ENCOUNTER FOR OTHER PREPROCEDURAL EXAMINATION: ICD-10-CM

## 2024-07-24 ENCOUNTER — APPOINTMENT (OUTPATIENT)
Dept: ENDOVASCULAR SURGERY | Facility: CLINIC | Age: 44
End: 2024-07-24
Payer: COMMERCIAL

## 2024-07-24 NOTE — REASON FOR VISIT
[Other ___] : a [unfilled] visit for [Inadequate Flow within AVF] : inadequate flow within AVF [Non-Maturing Fistula] : non-maturing fistula [Family Member] : family member [FreeTextEntry2] : c/o pain in left thumb and cold feeling

## 2024-07-24 NOTE — HISTORY OF PRESENT ILLNESS
[] : right internal jugular tunneled catheter [FreeTextEntry1] : 6/6/2024 Dr. Alonso [FreeTextEntry2] : 5/22/2024  [FreeTextEntry4] : yesterday via tunneled catheter [FreeTextEntry5] : today 7am [FreeTextEntry6] : Dr Montez

## 2024-07-24 NOTE — HISTORY OF PRESENT ILLNESS
[] : left radiocephalic fistula [FreeTextEntry1] : 6/6/2024 Dr. Alonso [FreeTextEntry2] : 5/22/2024  [FreeTextEntry4] : yesterday via tunneled catheter [FreeTextEntry5] : today 7am [FreeTextEntry6] : Dr Montez

## 2024-07-25 ENCOUNTER — NON-APPOINTMENT (OUTPATIENT)
Age: 44
End: 2024-07-25

## 2024-07-26 ENCOUNTER — RESULT REVIEW (OUTPATIENT)
Age: 44
End: 2024-07-26

## 2024-07-26 ENCOUNTER — APPOINTMENT (OUTPATIENT)
Dept: ENDOVASCULAR SURGERY | Facility: CLINIC | Age: 44
End: 2024-07-26

## 2024-07-26 VITALS
RESPIRATION RATE: 16 BRPM | HEART RATE: 76 BPM | TEMPERATURE: 98.2 F | OXYGEN SATURATION: 100 % | SYSTOLIC BLOOD PRESSURE: 192 MMHG | BODY MASS INDEX: 34.86 KG/M2 | HEIGHT: 68 IN | DIASTOLIC BLOOD PRESSURE: 94 MMHG | WEIGHT: 230 LBS

## 2024-07-26 DIAGNOSIS — T82.898A OTHER SPECIFIED COMPLICATION OF VASCULAR PROSTHETIC DEVICES, IMPLANTS AND GRAFTS, INITIAL ENCOUNTER: ICD-10-CM

## 2024-07-26 DIAGNOSIS — N18.6 END STAGE RENAL DISEASE: ICD-10-CM

## 2024-07-26 DIAGNOSIS — Z99.2 END STAGE RENAL DISEASE: ICD-10-CM

## 2024-07-26 PROCEDURE — 36215Z: CUSTOM | Mod: 58,59

## 2024-07-26 PROCEDURE — 36902Z: CUSTOM | Mod: 58

## 2024-07-26 PROCEDURE — 36909Z: CUSTOM

## 2024-07-26 NOTE — HISTORY OF PRESENT ILLNESS
[FreeTextEntry1] : 6/6/2024 Dr. Alonso [FreeTextEntry2] : 5/22/2024  [FreeTextEntry4] : yesterday via tunneled catheter [FreeTextEntry5] : 700pm last night [FreeTextEntry6] : Dr Montez

## 2024-07-26 NOTE — ASSESSMENT
[FreeTextEntry1] : immature fistula-plan for maturation
[FreeTextEntry1] : immature fistula-plan for maturation
No

## 2024-07-26 NOTE — PAST MEDICAL HISTORY
[Obesity: BMI >25] : Obesity: BMI >25 [FreeTextEntry1] : Malignant Hyperthermis (MH) Screening Tool and Risk of Bleeding Assessement Mr. FE TUCKER  denies family history of unexpected death following Anesthesia or Exercise. Denies Family history of Malignant Hyperthermia, Muscle or Neuromuscular disorder and High Temperature following exercise.  Mr. FE TUCKER denies history of Muscle Spasm, Dark or Chocolate - Colored urine and Unanticipated fever immediately following anesthesia or serious exercise.  Mr. TUCKER  also denies bleeding tendencies/ Risks of Bleeding

## 2024-07-29 ENCOUNTER — NON-APPOINTMENT (OUTPATIENT)
Age: 44
End: 2024-07-29

## 2024-08-12 ENCOUNTER — APPOINTMENT (OUTPATIENT)
Dept: VASCULAR SURGERY | Facility: CLINIC | Age: 44
End: 2024-08-12

## 2024-08-23 ENCOUNTER — APPOINTMENT (OUTPATIENT)
Dept: VASCULAR SURGERY | Facility: CLINIC | Age: 44
End: 2024-08-23
Payer: COMMERCIAL

## 2024-08-23 DIAGNOSIS — Z98.890 OTHER SPECIFIED POSTPROCEDURAL STATES: ICD-10-CM

## 2024-08-23 PROCEDURE — 93990 DOPPLER FLOW TESTING: CPT

## 2024-08-23 PROCEDURE — 99213 OFFICE O/P EST LOW 20 MIN: CPT | Mod: 24

## 2024-08-23 NOTE — REASON FOR VISIT
[de-identified] : Creation of left radiocephalic AV fistula [de-identified] : Status post AV fistula.  Denies fever or chills.  Patiently currently on hemodialysis via right-sided tunneled catheter.

## 2024-08-23 NOTE — DISCUSSION/SUMMARY
[FreeTextEntry1] : Duplex demonstrates well-functioning AV fistula with no evidence of stenosis.  Patient may use fistula for hemodialysis access.  We will plan for catheter removal in 2 to 3 weeks.

## 2024-09-03 ENCOUNTER — NON-APPOINTMENT (OUTPATIENT)
Age: 44
End: 2024-09-03

## 2024-09-03 ENCOUNTER — APPOINTMENT (OUTPATIENT)
Dept: CARDIOLOGY | Facility: CLINIC | Age: 44
End: 2024-09-03
Payer: COMMERCIAL

## 2024-09-03 VITALS
WEIGHT: 234 LBS | SYSTOLIC BLOOD PRESSURE: 170 MMHG | HEART RATE: 84 BPM | BODY MASS INDEX: 35.58 KG/M2 | DIASTOLIC BLOOD PRESSURE: 94 MMHG | OXYGEN SATURATION: 99 %

## 2024-09-03 DIAGNOSIS — Z01.818 ENCOUNTER FOR OTHER PREPROCEDURAL EXAMINATION: ICD-10-CM

## 2024-09-03 DIAGNOSIS — I10 ESSENTIAL (PRIMARY) HYPERTENSION: ICD-10-CM

## 2024-09-03 DIAGNOSIS — E11.21 TYPE 2 DIABETES MELLITUS WITH DIABETIC NEPHROPATHY: ICD-10-CM

## 2024-09-03 DIAGNOSIS — E66.9 OBESITY, UNSPECIFIED: ICD-10-CM

## 2024-09-03 DIAGNOSIS — Z79.4 TYPE 2 DIABETES MELLITUS WITH DIABETIC NEPHROPATHY: ICD-10-CM

## 2024-09-03 PROCEDURE — 93000 ELECTROCARDIOGRAM COMPLETE: CPT | Mod: NC

## 2024-09-03 PROCEDURE — G2211 COMPLEX E/M VISIT ADD ON: CPT

## 2024-09-03 PROCEDURE — 99214 OFFICE O/P EST MOD 30 MIN: CPT

## 2024-09-03 NOTE — CARDIOLOGY SUMMARY
[de-identified] : 5/29/2024 - sinus rhythm at 84 bpm, normal axis, nonspecific T-wave flattening, normal R-wave progression  [de-identified] : 6/26/2024 - mildly dilated LA, normal LV systolic function, LVEF 55-60% [de-identified] : 7/17/2024 exercise stress echo - 6 minutes and 41 seconds of Sedrick protocol (> 7 METS), 68% MPHR due to beta-blocker

## 2024-09-03 NOTE — DISCUSSION/SUMMARY
[FreeTextEntry1] : He is a 43 year-old who presents today for pretransplant cardiac evaluation prior to potential renal transplant with known cardiac risk factors as above.  He continues have ESRD on HD three times per week. His left radial AV fistula is maturing.   Insulin for diabetes. Atorvastatin 80 mg daily for dyslipidemia.  His blood pressure controlled on carvedilol and enalapril.   Consider GLP-1/GIP medication for weight loss in patient with cardiovascular risk factors. Also encouraged further dieting and exercise.   No further cardiovascular testing is necessary at this time prior to consideration for renal transplant. Plan to reassess sichemic evaluation in one year.  [EKG obtained to assist in diagnosis and management of assessed problem(s)] : EKG obtained to assist in diagnosis and management of assessed problem(s)

## 2024-09-03 NOTE — REASON FOR VISIT
[Other: ____] : [unfilled] [Other: ______] : provided by [employer] [FreeTextEntry1] : September 2024 - Patient returns today for follow-up. Patient reports that he becomes itchy during dialysis, but otherwise, he tolerates it well. In light of being young and obese, we tried to avoid a nuclear stress test and attempted an exercise stress test. He achieved 7 METS, but he only got to 68% MPHR due to beta-blocker usage.

## 2024-09-03 NOTE — PHYSICAL EXAM
[Well Developed] : well developed [Well Nourished] : well nourished [No Acute Distress] : no acute distress [Normal Conjunctiva] : normal conjunctiva [Normal Venous Pressure] : normal venous pressure [No Carotid Bruit] : no carotid bruit [Normal S1, S2] : normal S1, S2 [No Murmur] : no murmur [No Rub] : no rub [No Gallop] : no gallop [Clear Lung Fields] : clear lung fields [Good Air Entry] : good air entry [No Respiratory Distress] : no respiratory distress  [Soft] : abdomen soft [Non Tender] : non-tender [No Masses/organomegaly] : no masses/organomegaly [Normal Bowel Sounds] : normal bowel sounds [Normal Gait] : normal gait [No Edema] : no edema [No Cyanosis] : no cyanosis [No Clubbing] : no clubbing [No Varicosities] : no varicosities [No Rash] : no rash [No Skin Lesions] : no skin lesions [Moves all extremities] : moves all extremities [No Focal Deficits] : no focal deficits [Normal Speech] : normal speech [Alert and Oriented] : alert and oriented [Normal memory] : normal memory [Obese] : obese [de-identified] : left radial AV fistula, +thrill; right chest wall HD catheter

## 2024-09-03 NOTE — HISTORY OF PRESENT ILLNESS
[FreeTextEntry1] : Justin Lo presents today for pretransplant cardiac evaluation prior to potential renal transplant. He is also here today for preoperative cardiovascular evaluation prior to planned left radio cephalic fistula on 2024 with Zach Alonso MD.   He is a 43-year-old, born in [place], with known cardiac risk factors of chronic hypertension, dyslipidemia, and insulin dependent diabetes mellitus (diagnosed 13 years ago, A1c 7.2%) and end stage renal disease on hemodialysis (---S at [facility] via left chest catheter, now pending AV fistula creation on 2024).   His surgical history includes appendectomy, penile implant.   His father is . He had ESRD with renal transplant,  during Covid.  His mother is alive and well.  He has one brother. He has 2 half-sisters.  Single. He has one daughter, age 14.  He works at a car dealership.  For exercise he uses a stationary bicycle for one hour 3 times a week.  No chest discomfort, shortness of breath, palpitations, lightheadedness or syncope.

## 2024-09-30 ENCOUNTER — APPOINTMENT (OUTPATIENT)
Dept: CT IMAGING | Facility: CLINIC | Age: 44
End: 2024-09-30
Payer: COMMERCIAL

## 2024-09-30 PROCEDURE — 71250 CT THORAX DX C-: CPT

## 2024-10-02 ENCOUNTER — APPOINTMENT (OUTPATIENT)
Dept: INFECTIOUS DISEASE | Facility: CLINIC | Age: 44
End: 2024-10-02
Payer: COMMERCIAL

## 2024-10-02 VITALS
HEART RATE: 101 BPM | RESPIRATION RATE: 16 BRPM | WEIGHT: 234 LBS | SYSTOLIC BLOOD PRESSURE: 159 MMHG | OXYGEN SATURATION: 99 % | BODY MASS INDEX: 35.46 KG/M2 | HEIGHT: 68 IN | DIASTOLIC BLOOD PRESSURE: 79 MMHG

## 2024-10-02 DIAGNOSIS — Z23 ENCOUNTER FOR IMMUNIZATION: ICD-10-CM

## 2024-10-02 DIAGNOSIS — Z22.7 LATENT TUBERCULOSIS: ICD-10-CM

## 2024-10-02 PROCEDURE — 99204 OFFICE O/P NEW MOD 45 MIN: CPT | Mod: 25

## 2024-10-02 PROCEDURE — 90472 IMMUNIZATION ADMIN EACH ADD: CPT

## 2024-10-02 PROCEDURE — 90678 RSV VACC PREF BIVALENT IM: CPT

## 2024-10-02 PROCEDURE — 90656 IIV3 VACC NO PRSV 0.5 ML IM: CPT

## 2024-10-02 PROCEDURE — G0009: CPT

## 2024-10-02 PROCEDURE — 90677 PCV20 VACCINE IM: CPT

## 2024-10-02 RX ORDER — INFLUENZA A VIRUS A/VICTORIA/4897/2022 IVR-238 (H1N1) ANTIGEN (FORMALDEHYDE INACTIVATED), INFLUENZA A VIRUS A/CALIFORNIA/122/2022 SAN-022 (H3N2) ANTIGEN (FORMALDEHYDE INACTIVATED), AND INFLUENZA B VIRUS B/MICHIGAN/01/2021 ANTIGEN (FORMALDEHYDE INACTIVATED) 15; 15; 15 MG/.5ML; MG/.5ML; MG/.5ML
0.5 INJECTION, SUSPENSION INTRAMUSCULAR
Qty: 1 | Refills: 0 | Status: ACTIVE | COMMUNITY
Start: 2024-10-02 | End: 1900-01-01

## 2024-10-02 RX ORDER — ISONIAZID 300 MG/1
300 TABLET ORAL DAILY
Qty: 90 | Refills: 3 | Status: ACTIVE | COMMUNITY
Start: 2024-10-02 | End: 1900-01-01

## 2024-10-02 NOTE — HISTORY OF PRESENT ILLNESS
[FreeTextEntry1] : Jul 10 2024  2:00PM   Underlying Disease(s) Requiring Transplant:  --------------------------------------------------------------------------------- Mr. FE GARCIA  is a 43 year  year-old M   undergoing evaluation for transplantation. Infectious diseases (ID) has been consulted for assessment of infection risks and to render an opinion as to whether or not they are a suitable candidate for transplantation from the infectious diseases standpoint. Referred to ID for positive quantiferon   Explains he has been tested for Tb in the past with [facility] around 2010 for immigration and was negative, has returned though to [facility] many times since Denies cough, SOb, fever, chills, NS, weight loss,  Since his last visit, he states he has had a CT chest which shows an area of 8 mm of GGO and calcified granulomas.  denies cough, SOB, fever, chills, NS  -------------------------------------------------------- Past/current ID issues (taken from patient report and records) history of hospitalization due to infection: NO History of bacteremia, sepsis:NO History of endocarditis: NO History of MDRO: NO History of pneumonia:NO History of Flu/COVID 19:  covid 19 x3, last in september, never treated, home  History of recurrent UTIs: NO History of infectious diarrhea, Cdiff: NO History of dental infection: NO History of meningitis: NO History of sinusitis:NO Childhood history of recurrent pharyngitis/strep throat; ear infections, sinusitis: NO  History of STD: NO History of MC herpes:No History of Shingles: NO History of FUO: NO History of OM, skin soft tissue infections: NO -- History of tonsillectomy/appendectomy/splenectomy: h/o appendicitis,  History of metal hardware (e.g:PPM/defibrillator, prosthesis, Pins/needles):NO- has a tooth implant  No h/o transfusions, no IVDU, no drug use, no smoking ---------------------------------------------------------------------------------------------------------------------------------- Childhood illnesses Chickenpox: No ?   Measles- mother states he had it, Rubella, Scarlet fever, RH fever: NO  --------------------------------------------------------------------------------------------------------------------------------- Exposure/Travel History:    Current Residence (Born and raised): [place] , until [place]- then came to the , lives in [facility] Lived in other states: No Travel within  (including [facility] or [facility]): , Travelled to [place] for a week  Foreign travel history:  NO Work: [employer]: Used to be in agriculture- owner of a land [employer], and was a  student in [employer] ------------------------------------------------------------------------------------------------------------------------- Tuberculosis exposure history:   no clear contact  History of screening, if yes, treatment: yes ,2011  and negative exposure: no history of contacts, has not lived/stayed/volunteered in any facility such as shelter, [facility], correctional,  base. Never been homeless.   work in Healthcare setting:no ---------------------------------------------------------------------------------------------------------------------------- Animal Exposure history: 	Domestic : no  	Wildlife :no 	Livestock animals: no 	Birds :no 	Fishes :no 	other:  ----------------------------------------------------------------------------------------------------------------------------- Dietary Habits:  Reviewed risks of consuming and advised to avoid: 	Raw animal or dairy products -unpasteurized milk /cheese. 	Raw seafood -sushi- eats lots of fish - no raw 	Raw eggs - 	Raw or undercooked meat/poultry -   	Unpasteurized milk products  -   	Home made sausage - no      Unwashed vegetables -  Reviewed food safety as it relates to infection risks in the setting of transplantation in the immunocompromised host.   Reviewed CDC guidelines for the prevention of Listeria in the immunocompromised host.      Environment: 	Residence water supply: city[place]island 	Reviewed of occupational and recreational exposure risks as they relate to infection in the setting of transplantation.  --------------------------------------------------------------------- Prior or current immunosuppressive therapies:  ---------------------------------------------------------------------- Immunization History: Vaccines: live vaccines are generally avoided in the transplant recipient following transplantation.  Live vaccines are permitted up to one month prior to transplant.    -------------------------------------------------------------------------------------------------------------------------------------------------- ROS GENERAL: denies chills, , night sweats, weight loss.  PSYCH: denies depression, anxiety, suicidal ideation, hallucination, and delusions SKIN: no rash or lesions; no color changes, no abnormal nevi,no  dryness, and no jaundice   EYES: denies visual changes, floaters, pain, inflammation, blurred vision, and discharge ENT: denies tinnitus, vertigo, epistaxis, oral lesion, and decreased acuity PULM: denies, hemoptysis, pleurisy CVS: denies angina, palpitations,+ orthopnea, no syncope, or heart murmur GI: denies constipation, diarrhea, melena, abdominal pain, nausea.  : denies dysuria, frequency, discharge, incontinence, stones or macroscopic hematuria MS: no arthralgias, no erythema or swelling, no myalgias, no edema, or lower back pain.  CNS: denies numbness, dizziness, seizure, or tremor ENDO: denies heat/cold intolerance, polyuria, polydipsia, malaise.   HEME: denies bruising, bleeding, lymphadenopathy, anemia, and calf pain --------------------------------------------------------------------------------------------------------------------------------------------------------- Physical Exam:  General: AOx3, NAD HEENT: NCAT, normal conjunctiva with no icterus or erythema, no oropharyngeal abnormalities, no abscess and good oral hygiene  Neck: ROM normal, supple, no meningismus, no lymphadenopathy  Cardiovascular: regular rhythm;  no murmurs, rubs, no S3, S4; palpable pedal pulses Respiratory:  no wheezes, rales or rhonchi,, normal airway entry bilaterally Abdominal:  non distended,  abdomen soft, non-tender,  with no fluid shift. No Hepatosplenomegaly.  Genitourinary: no CVA, bladder tenderness Neurological: AOx3, NAD, interactive and appropriate, spontaneously moves all extremities, CN grossly intact, no focal deficits appreciated Musculoskeletal/extremities: Strength symmetric,  no lower extremity edema  Skin: Warm, dry, no rashes, nodules, wounds or other lesions

## 2024-10-02 NOTE — ASSESSMENT
[FreeTextEntry1] : A. ACTIVE INFECTIOUS DISEASES ISSUES 1. LATENT TB- quant highly positive - from [facility] - prior testing neg >10 y ago with subsequent travels back - prior treatment no - No symptoms or signs raising concern for active TB Ct chest reviewed, no signs of active TB but calcified granulomas c/w latent TB  TREATMENT OPTIONS - Treatment options for latent TB were discussed with the patient and present family, specifically efficacy, duration, dosing and administration, side effects, drug interactions. - Options include:              1. Weekly Isoniazid 15 mg/kg weekly with max dose of 900 mg weekly and Rifapentine (<50 k mg weekly; >50 kg maximum 900 mg weekly dosing)  with daily vitamin B6 50 mg daily for 3 months              2. Rifampin 600 mg daily for 4 months              3. Isoniazid 300 mg daily and vitamin B6 daily for 9 months. --> selected option in light of potential for transplantation prior to treatment completion.   TREATMENT SIDE EFFECTS Side effects of isoniazid include but are not limited to: rash,transaminitis, right upper quadrant pain, nausea, vomiting, jaundice, numbness/tingling (peripheral neuropathy), leucopenia  TIMING OF INITIATION - will start now and ideally would like him to complete 3 months at least prior to transplantation, will continue posttransplant to complete 9 months f therapy with INH and vitamin B6  MONITORING - Patient will need weekly LFTS at baseline, weekly for the first month and monthly until treatment has ended.  - Will need monthly ID visits to monitor clinically and to ensure medication compliance. - RTC in 1 month - ordered lab weekly x 1 month  EDUCATION AND COUNSELING Patient instructed about the differences between latent TB infection and active TB disease, risks of reactivation yearly and in a lifetime pre-transplant vs risks in the post-transplant setting. Patient was also instructed on the importance of medication compliance and risks of resistance development with regularly missed dose or incomplete therapy. ----------------- B. PRE-TRANSPLANT EVALUTION AND PREVENTIVE CARE ------- - No current contraindication from the Infectious Diseases standpoint to proceed with listing/transplantation ------- 1. Routine and additional ID screening Please send, if not done yet and/or follow: - HIV Ab/Ag  NEG - HSV 1/2 IgG POS/neg - VZV IgG NEgative - EBV Serology panel (or VCA IgG) POS - CMV IgGPOS - Hepatitis A IgGPOS - Hepatitis B surface IgG POS>100 no need to vaccinate - Hepatitis B core IgG neg - Hepatitis B surface Antigen eng - Hepatitis C Antibody neg  - Measles ([place]) IgG ? - Rubella IgG ([place] Measles) pos  - Mumps IgG ? - Syphilis screening (antitreponemal antibody with reflex to RPR) neg - Quantiferon Gold  POS -Toxoplasma IgG POS - Strongyloides IgG neg - Trypanozoma Cruzi IgG (Chagas Disease) neg  ----- 2. Immunizations  -Overall, vaccinations are recommended ideally in the pre-transplant period, at least 14 days prior to transplantation.  -If transplantation is felt imminent, then immunizations should be deferred to the post-transplant period, at least 4-6 months post-transplant with the exception of COVID-19 vaccination and the flu vaccine, which can be started as early as 1 month post-transplant. Similarly, all incomplete schedules prior to transplantation should be deferred to 4-6 months post-transplant.  - No live vaccinations should be administered unless transplantation is not anticipated or planned within the 4 weeks following vaccine administration.  recommended vaccines - If no plan to transplant in the next 4 weeks, would administer varivax--> will give this with next visit  - The seasonal flu vaccine and RSV vaccine today  - COVID-19  - Tdap  (tetanus, diphteria, pertussis): once, with follow up Td every 10 years needed--> will give at next visit - Prevnar 20 (pneumococcal vaccine): once with no additional pneumococcal vaccine needed- today  - Shingrix (Herpes Zoster): Will need a second dose 2 to 6 months later if not transplanted at the time of due vaccine. - Varivax     3. PERIOPERATIVE AND POST-TRANSPLANT PROPHYLAXIS  Perioperative ANTIBACTERIAL prophylaxis: - No history of MDRO: prophylaxis per protocol   VIRAL Prophylaxis; --- CMV PROPHYLAXIS: based on donor/recipient IgG status CMV R+: intermediate risk of reactivation.  Valcyte for 3-6 months , ideallyat full dose of 900 mg daily to avoid resistances. Alternatively, a preemptive approach may be elected of weekly plasma PCR checks and early treatment with reactivation. Once antivirals are discontinued, recommend serial CMV PCR weekly for 1 months, then every 2 weeks for 1 months, then monthly until 6 months have passed to detect early reactivations.Patients should be  advised to contact transplant center for evaluation of fever, myalgias, malaise, headache, diarrhea and other new complaints  --- HSV/VZV prophylaxis: Patients who do not recive valcyte during the first month post-transplant should receive acyclovir 400 mg bid to prevent early HSV reactivation or in rare instances, donor derived HSV/VZV. longer duration may be considered in patients with history of recurrent HSV/VZV.  PCP/Toxoplasmosis:  --- PCP prophylaxis:  PO TMP-SMX 1 SS or DS daily (if at high risk of toxoplasmosis) for at least 6 months per protocol Alternatives for TMP-SMX allergic/intolerant patients  	Atovaquone 1500 PO once daily (also prevents toxoplasma) if NOT at high risk for Toxoplasmosis 	Dapsone 100 mg PO once daily (after excluding G6PD deficiency), sulfone derivative - low risk of cross-reactivity with mild sulfa allergy .       pentamidine monthly: least preferred option;   ---Toxoplasma prophylaxis:  Toxoplasmosis R+ patients are at moderate risk of reactivation and thus still would recommend bactrim or atovaquone prophylaxis for 6 months.    4. REFERRALS - Allergy/immunology for clearance of pre-transplant allergies - Dental : for dental clearance- a yearly checkup, ideally once pre-transplant is recommended.  RTC in 1 month

## 2024-10-16 ENCOUNTER — APPOINTMENT (OUTPATIENT)
Dept: VASCULAR SURGERY | Facility: CLINIC | Age: 44
End: 2024-10-16
Payer: COMMERCIAL

## 2024-10-16 ENCOUNTER — APPOINTMENT (OUTPATIENT)
Dept: ENDOVASCULAR SURGERY | Facility: CLINIC | Age: 44
End: 2024-10-16
Payer: COMMERCIAL

## 2024-10-16 PROCEDURE — 93990 DOPPLER FLOW TESTING: CPT

## 2024-10-18 ENCOUNTER — APPOINTMENT (OUTPATIENT)
Dept: ENDOVASCULAR SURGERY | Facility: CLINIC | Age: 44
End: 2024-10-18

## 2024-11-01 ENCOUNTER — APPOINTMENT (OUTPATIENT)
Dept: ENDOVASCULAR SURGERY | Facility: CLINIC | Age: 44
End: 2024-11-01

## 2024-11-06 ENCOUNTER — NON-APPOINTMENT (OUTPATIENT)
Age: 44
End: 2024-11-06

## 2024-11-06 ENCOUNTER — APPOINTMENT (OUTPATIENT)
Dept: ENDOVASCULAR SURGERY | Facility: CLINIC | Age: 44
End: 2024-11-06
Payer: COMMERCIAL

## 2024-11-06 PROCEDURE — 36589 REMOVAL TUNNELED CV CATH: CPT

## 2024-12-11 ENCOUNTER — NON-APPOINTMENT (OUTPATIENT)
Age: 44
End: 2024-12-11

## 2024-12-11 ENCOUNTER — APPOINTMENT (OUTPATIENT)
Dept: INFECTIOUS DISEASE | Facility: CLINIC | Age: 44
End: 2024-12-11
Payer: COMMERCIAL

## 2024-12-11 VITALS
TEMPERATURE: 97.7 F | SYSTOLIC BLOOD PRESSURE: 134 MMHG | HEIGHT: 68 IN | WEIGHT: 234 LBS | OXYGEN SATURATION: 99 % | DIASTOLIC BLOOD PRESSURE: 81 MMHG | BODY MASS INDEX: 35.46 KG/M2 | RESPIRATION RATE: 16 BRPM | HEART RATE: 85 BPM

## 2024-12-11 DIAGNOSIS — Z23 ENCOUNTER FOR IMMUNIZATION: ICD-10-CM

## 2024-12-11 PROCEDURE — 99214 OFFICE O/P EST MOD 30 MIN: CPT | Mod: 25

## 2024-12-11 PROCEDURE — 90471 IMMUNIZATION ADMIN: CPT

## 2024-12-11 PROCEDURE — 90716 VAR VACCINE LIVE SUBQ: CPT

## 2024-12-12 LAB
ALBUMIN SERPL ELPH-MCNC: 3.8 G/DL
ALP BLD-CCNC: 77 U/L
ALT SERPL-CCNC: 11 U/L
ANION GAP SERPL CALC-SCNC: 14 MMOL/L
AST SERPL-CCNC: 18 U/L
BILIRUB SERPL-MCNC: <0.2 MG/DL
BUN SERPL-MCNC: 60 MG/DL
CALCIUM SERPL-MCNC: 9.4 MG/DL
CHLORIDE SERPL-SCNC: 102 MMOL/L
CO2 SERPL-SCNC: 27 MMOL/L
CREAT SERPL-MCNC: 9.39 MG/DL
EGFR: 6 ML/MIN/1.73M2
GLUCOSE SERPL-MCNC: 88 MG/DL
POTASSIUM SERPL-SCNC: 5.3 MMOL/L
PROT SERPL-MCNC: 6.3 G/DL
SODIUM SERPL-SCNC: 143 MMOL/L

## 2025-01-24 ENCOUNTER — APPOINTMENT (OUTPATIENT)
Dept: VASCULAR SURGERY | Facility: CLINIC | Age: 45
End: 2025-01-24
Payer: COMMERCIAL

## 2025-01-24 PROCEDURE — 99213 OFFICE O/P EST LOW 20 MIN: CPT

## 2025-01-24 PROCEDURE — G2211 COMPLEX E/M VISIT ADD ON: CPT | Mod: NC

## 2025-01-24 PROCEDURE — 93990 DOPPLER FLOW TESTING: CPT

## 2025-03-04 ENCOUNTER — NON-APPOINTMENT (OUTPATIENT)
Age: 45
End: 2025-03-04

## 2025-03-04 ENCOUNTER — APPOINTMENT (OUTPATIENT)
Dept: CARDIOLOGY | Facility: CLINIC | Age: 45
End: 2025-03-04
Payer: COMMERCIAL

## 2025-03-04 VITALS
DIASTOLIC BLOOD PRESSURE: 82 MMHG | BODY MASS INDEX: 34.1 KG/M2 | SYSTOLIC BLOOD PRESSURE: 112 MMHG | HEART RATE: 82 BPM | OXYGEN SATURATION: 98 % | WEIGHT: 225 LBS | HEIGHT: 68 IN

## 2025-03-04 DIAGNOSIS — E66.9 OBESITY, UNSPECIFIED: ICD-10-CM

## 2025-03-04 DIAGNOSIS — Z79.4 TYPE 2 DIABETES MELLITUS WITH DIABETIC NEPHROPATHY: ICD-10-CM

## 2025-03-04 DIAGNOSIS — E11.21 TYPE 2 DIABETES MELLITUS WITH DIABETIC NEPHROPATHY: ICD-10-CM

## 2025-03-04 DIAGNOSIS — Z01.818 ENCOUNTER FOR OTHER PREPROCEDURAL EXAMINATION: ICD-10-CM

## 2025-03-04 PROCEDURE — 99215 OFFICE O/P EST HI 40 MIN: CPT

## 2025-03-04 PROCEDURE — 93000 ELECTROCARDIOGRAM COMPLETE: CPT | Mod: NC

## 2025-03-10 ENCOUNTER — APPOINTMENT (OUTPATIENT)
Dept: CARDIOLOGY | Facility: CLINIC | Age: 45
End: 2025-03-10
Payer: COMMERCIAL

## 2025-03-10 PROCEDURE — 93306 TTE W/DOPPLER COMPLETE: CPT

## 2025-03-21 ENCOUNTER — APPOINTMENT (OUTPATIENT)
Dept: CT IMAGING | Facility: CLINIC | Age: 45
End: 2025-03-21
Payer: COMMERCIAL

## 2025-03-21 PROCEDURE — 75574 CT ANGIO HRT W/3D IMAGE: CPT

## 2025-06-17 ENCOUNTER — LABORATORY RESULT (OUTPATIENT)
Age: 45
End: 2025-06-17

## 2025-06-17 ENCOUNTER — APPOINTMENT (OUTPATIENT)
Dept: TRANSPLANT | Facility: CLINIC | Age: 45
End: 2025-06-17
Payer: COMMERCIAL

## 2025-06-17 VITALS
SYSTOLIC BLOOD PRESSURE: 152 MMHG | BODY MASS INDEX: 33.34 KG/M2 | WEIGHT: 220 LBS | HEART RATE: 82 BPM | DIASTOLIC BLOOD PRESSURE: 75 MMHG | RESPIRATION RATE: 16 BRPM | HEIGHT: 68 IN | TEMPERATURE: 97.5 F | OXYGEN SATURATION: 100 %

## 2025-06-17 PROBLEM — N18.6 END-STAGE RENAL DISEASE: Status: ACTIVE | Noted: 2024-05-21

## 2025-06-17 LAB
APPEARANCE: CLEAR
BILIRUBIN URINE: NEGATIVE
BLOOD URINE: ABNORMAL
COLOR: YELLOW
COVID-19 SPIKE DOMAIN ANTIBODY INTERPRETATION: POSITIVE
GLUCOSE QUALITATIVE U: >=1000 MG/DL
HCT VFR BLD CALC: 34.7 %
HGB BLD-MCNC: 11.4 G/DL
KETONES URINE: NEGATIVE MG/DL
LEUKOCYTE ESTERASE URINE: NEGATIVE
MCHC RBC-ENTMCNC: 29.1 PG
MCHC RBC-ENTMCNC: 32.9 G/DL
MCV RBC AUTO: 88.5 FL
NITRITE URINE: NEGATIVE
PARATHYROID HORMONE INTACT: 255 PG/ML
PH URINE: 6.5
PLATELET # BLD AUTO: 213 K/UL
PROTEIN URINE: >=1000 MG/DL
PSA SERPL-MCNC: 0.73 NG/ML
RBC # BLD: 3.92 M/UL
RBC # FLD: 15.6 %
SARS-COV-2 AB SERPL IA-ACNC: >250 U/ML
SPECIFIC GRAVITY URINE: 1.03
T3 SERPL-MCNC: 105 NG/DL
T4 FREE SERPL-MCNC: 1.1 NG/DL
TSH SERPL-ACNC: 1.69 UIU/ML
UROBILINOGEN URINE: 0.2 MG/DL
WBC # FLD AUTO: 6.57 K/UL

## 2025-06-17 PROCEDURE — 99215 OFFICE O/P EST HI 40 MIN: CPT

## 2025-06-17 RX ORDER — SODIUM ZIRCONIUM CYCLOSILICATE 10 G/10G
10 POWDER, FOR SUSPENSION ORAL
Refills: 0 | Status: ACTIVE | COMMUNITY
Start: 2025-06-17

## 2025-06-17 RX ORDER — DULAGLUTIDE 0.75 MG/.5ML
0.75 INJECTION, SOLUTION SUBCUTANEOUS
Refills: 0 | Status: ACTIVE | COMMUNITY
Start: 2025-06-17

## 2025-06-18 LAB
ABORH: NORMAL
ALBUMIN SERPL ELPH-MCNC: 4.3 G/DL
ALP BLD-CCNC: 65 U/L
ALT SERPL-CCNC: 16 U/L
ANION GAP SERPL CALC-SCNC: 20 MMOL/L
AST SERPL-CCNC: 16 U/L
BILIRUB SERPL-MCNC: 0.2 MG/DL
BUN SERPL-MCNC: 69 MG/DL
C PEPTIDE SERPL-MCNC: 28.8 NG/ML
CALCIUM SERPL-MCNC: 9.6 MG/DL
CHLORIDE SERPL-SCNC: 99 MMOL/L
CHOLEST SERPL-MCNC: 162 MG/DL
CK SERPL-CCNC: 129 U/L
CMV IGG SERPL QL: 8.2 U/ML
CMV IGG SERPL-IMP: POSITIVE
CO2 SERPL-SCNC: 20 MMOL/L
CREAT SERPL-MCNC: 9.71 MG/DL
CREAT SPEC-SCNC: 81 MG/DL
CREAT/PROT UR: 23.9 RATIO
CRP SERPL-MCNC: <3 MG/L
EBV DNA SERPL NAA+PROBE-ACNC: NOT DETECTED IU/ML
EBV EA AB SER IA-ACNC: <5 U/ML
EBV EA AB TITR SER IF: POSITIVE
EBV EA IGG SER QL IA: 83.9 U/ML
EBV EA IGG SER-ACNC: NEGATIVE
EBV EA IGM SER IA-ACNC: NEGATIVE
EBV PATRN SPEC IB-IMP: NORMAL
EBV VCA IGG SER IA-ACNC: >750 U/ML
EBV VCA IGM SER QL IA: <10 U/ML
EBVPCR LOG: NOT DETECTED LOG10IU/ML
EGFRCR SERPLBLD CKD-EPI 2021: 6 ML/MIN/1.73M2
EPSTEIN-BARR VIRUS CAPSID ANTIGEN IGG: POSITIVE
ERYTHROCYTE [SEDIMENTATION RATE] IN BLOOD BY WESTERGREN METHOD: 35 MM/HR
ESTIMATED AVERAGE GLUCOSE: 160 MG/DL
GLUCOSE SERPL-MCNC: 338 MG/DL
HBA1C MFR BLD HPLC: 7.2 %
HBV CORE IGG+IGM SER QL: NONREACTIVE
HBV SURFACE AB SER QL: REACTIVE
HBV SURFACE AB SERPL IA-ACNC: 258.2 MIU/ML
HBV SURFACE AG SER QL: NONREACTIVE
HCV AB SER QL: NONREACTIVE
HCV S/CO RATIO: 0.1 S/CO
HDLC SERPL-MCNC: 35 MG/DL
HEPATITIS A IGG ANTIBODY: REACTIVE
HEPATITIS A IGG ANTIBODY: REACTIVE
HIV1+2 AB SPEC QL IA.RAPID: NONREACTIVE
HSV 1+2 IGG SER IA-IMP: NEGATIVE
HSV 1+2 IGG SER IA-IMP: POSITIVE
HSV 1+2 IGG SER IA-IMP: POSITIVE
HSV1 IGG SER QL: 51.8 INDEX
HSV1 IGG SER QL: 51.8 INDEX
HSV2 IGG SER QL: 0.44 INDEX
LDLC SERPL-MCNC: 75 MG/DL
MAGNESIUM SERPL-MCNC: 2.8 MG/DL
MEV IGG FLD QL IA: >300 AU/ML
MEV IGG FLD QL IA: >300 AU/ML
MEV IGG+IGM SER-IMP: POSITIVE
MEV IGG+IGM SER-IMP: POSITIVE
MUV AB SER-ACNC: POSITIVE
MUV IGG SER QL IA: 61.1 AU/ML
NONHDLC SERPL-MCNC: 127 MG/DL
PHOSPHATE SERPL-MCNC: 7.3 MG/DL
POTASSIUM SERPL-SCNC: 5.1 MMOL/L
PROT SERPL-MCNC: 6.8 G/DL
PROT UR-MCNC: 1942 MG/DL
RUBV IGG FLD-ACNC: >33 INDEX
RUBV IGG FLD-ACNC: >33 INDEX
RUBV IGG SER-IMP: POSITIVE
RUBV IGG SER-IMP: POSITIVE
SODIUM SERPL-SCNC: 138 MMOL/L
T GONDII AB SER-IMP: POSITIVE
T GONDII IGG SER QL: 23.5 IU/ML
T PALLIDUM AB SER QL IA: NEGATIVE
TRIGL SERPL-MCNC: 325 MG/DL
URATE SERPL-MCNC: 5 MG/DL
VZV AB TITR SER: POSITIVE
VZV AB TITR SER: POSITIVE
VZV IGG SER IF-ACNC: 8.63 S/CO
VZV IGG SER IF-ACNC: 8.63 S/CO

## 2025-06-19 LAB — STRONGYLOIDES AB SER IA-ACNC: NEGATIVE

## 2025-06-20 ENCOUNTER — APPOINTMENT (OUTPATIENT)
Dept: NEPHROLOGY | Facility: CLINIC | Age: 45
End: 2025-06-20

## 2025-06-24 LAB
M TB IFN-G BLD-IMP: POSITIVE
QUANTIFERON TB PLUS MITOGEN MINUS NIL: >10 IU/ML
QUANTIFERON TB PLUS NIL: 0.2 IU/ML
QUANTIFERON TB PLUS TB1 MINUS NIL: 4.11 IU/ML
QUANTIFERON TB PLUS TB2 MINUS NIL: 4.16 IU/ML
T CRUZI AB SER-ACNC: NONREACTIVE

## 2025-07-07 ENCOUNTER — APPOINTMENT (OUTPATIENT)
Dept: CT IMAGING | Facility: CLINIC | Age: 45
End: 2025-07-07
Payer: COMMERCIAL

## 2025-07-07 PROCEDURE — 71250 CT THORAX DX C-: CPT

## 2025-07-07 PROCEDURE — 74176 CT ABD & PELVIS W/O CONTRAST: CPT

## 2025-07-16 ENCOUNTER — APPOINTMENT (OUTPATIENT)
Dept: INFECTIOUS DISEASE | Facility: CLINIC | Age: 45
End: 2025-07-16
Payer: COMMERCIAL

## 2025-07-16 VITALS
HEART RATE: 79 BPM | OXYGEN SATURATION: 100 % | DIASTOLIC BLOOD PRESSURE: 83 MMHG | HEIGHT: 68 IN | SYSTOLIC BLOOD PRESSURE: 160 MMHG | TEMPERATURE: 97.5 F | WEIGHT: 225 LBS | BODY MASS INDEX: 34.1 KG/M2

## 2025-07-16 PROCEDURE — 99214 OFFICE O/P EST MOD 30 MIN: CPT

## 2025-07-25 ENCOUNTER — APPOINTMENT (OUTPATIENT)
Dept: VASCULAR SURGERY | Facility: CLINIC | Age: 45
End: 2025-07-25
Payer: COMMERCIAL

## 2025-07-25 PROCEDURE — 99213 OFFICE O/P EST LOW 20 MIN: CPT

## 2025-07-25 PROCEDURE — 93990 DOPPLER FLOW TESTING: CPT

## 2025-08-14 ENCOUNTER — APPOINTMENT (OUTPATIENT)
Dept: CARDIOLOGY | Facility: CLINIC | Age: 45
End: 2025-08-14
Payer: COMMERCIAL

## 2025-08-14 VITALS
BODY MASS INDEX: 32.84 KG/M2 | HEART RATE: 78 BPM | DIASTOLIC BLOOD PRESSURE: 80 MMHG | SYSTOLIC BLOOD PRESSURE: 146 MMHG | OXYGEN SATURATION: 98 % | WEIGHT: 216 LBS

## 2025-08-14 DIAGNOSIS — Z01.818 ENCOUNTER FOR OTHER PREPROCEDURAL EXAMINATION: ICD-10-CM

## 2025-08-14 PROCEDURE — 99215 OFFICE O/P EST HI 40 MIN: CPT

## 2025-08-14 PROCEDURE — 93000 ELECTROCARDIOGRAM COMPLETE: CPT | Mod: NC

## 2025-09-18 ENCOUNTER — APPOINTMENT (OUTPATIENT)
Dept: ENDOCRINOLOGY | Facility: CLINIC | Age: 45
End: 2025-09-18
Payer: COMMERCIAL

## 2025-09-18 VITALS
HEIGHT: 68 IN | OXYGEN SATURATION: 98 % | RESPIRATION RATE: 16 BRPM | WEIGHT: 214 LBS | SYSTOLIC BLOOD PRESSURE: 158 MMHG | BODY MASS INDEX: 32.43 KG/M2 | DIASTOLIC BLOOD PRESSURE: 91 MMHG | TEMPERATURE: 98.2 F | HEART RATE: 100 BPM

## 2025-09-18 DIAGNOSIS — I10 ESSENTIAL (PRIMARY) HYPERTENSION: ICD-10-CM

## 2025-09-18 DIAGNOSIS — E11.22 TYPE 2 DIABETES MELLITUS WITH DIABETIC CHRONIC KIDNEY DISEASE: ICD-10-CM

## 2025-09-18 DIAGNOSIS — N18.6 TYPE 2 DIABETES MELLITUS WITH DIABETIC CHRONIC KIDNEY DISEASE: ICD-10-CM

## 2025-09-18 DIAGNOSIS — N18.6 END STAGE RENAL DISEASE: ICD-10-CM

## 2025-09-18 DIAGNOSIS — Z99.2 TYPE 2 DIABETES MELLITUS WITH DIABETIC CHRONIC KIDNEY DISEASE: ICD-10-CM

## 2025-09-18 DIAGNOSIS — Z79.4 TYPE 2 DIABETES MELLITUS WITH DIABETIC CHRONIC KIDNEY DISEASE: ICD-10-CM

## 2025-09-18 LAB — HBA1C MFR BLD HPLC: 7.2

## 2025-09-18 PROCEDURE — G2211 COMPLEX E/M VISIT ADD ON: CPT | Mod: NC

## 2025-09-18 PROCEDURE — 99204 OFFICE O/P NEW MOD 45 MIN: CPT

## 2025-09-18 PROCEDURE — 83036 HEMOGLOBIN GLYCOSYLATED A1C: CPT | Mod: QW

## 2025-09-18 RX ORDER — ISOPROPYL ALCOHOL 0.7 ML/ML
SWAB TOPICAL
Qty: 7 | Refills: 1 | Status: ACTIVE | COMMUNITY
Start: 2025-09-18 | End: 1900-01-01

## 2025-09-18 RX ORDER — PEN NEEDLE, DIABETIC 32GX 5/32"
NEEDLE, DISPOSABLE MISCELLANEOUS
Qty: 1 | Refills: 0 | Status: ACTIVE | COMMUNITY
Start: 2025-09-18 | End: 1900-01-01

## 2025-09-18 RX ORDER — ELECTROLYTES/DEXTROSE
32G X 4 MM SOLUTION, ORAL ORAL
Qty: 4 | Refills: 1 | Status: ACTIVE | COMMUNITY
Start: 2025-09-18 | End: 1900-01-01

## 2025-09-18 RX ORDER — BLOOD-GLUCOSE SENSOR
EACH MISCELLANEOUS
Qty: 9 | Refills: 1 | Status: ACTIVE | COMMUNITY
Start: 2025-09-18 | End: 1900-01-01

## 2025-09-18 RX ORDER — DEXTROSE 3.75 G
4 TABLET,CHEWABLE ORAL
Qty: 1 | Refills: 0 | Status: ACTIVE | COMMUNITY
Start: 2025-09-18 | End: 1900-01-01

## 2025-09-18 RX ORDER — INSULIN LISPRO 200 [IU]/ML
200 INJECTION, SOLUTION SUBCUTANEOUS
Qty: 7 | Refills: 1 | Status: ACTIVE | COMMUNITY
Start: 2025-09-18 | End: 1900-01-01

## 2025-09-18 RX ORDER — INSULIN DEGLUDEC INJECTION 200 U/ML
200 INJECTION, SOLUTION SUBCUTANEOUS
Qty: 3 | Refills: 0 | Status: ACTIVE | COMMUNITY
Start: 2025-09-18 | End: 1900-01-01

## 2025-09-18 RX ORDER — BLOOD-GLUCOSE METER
W/DEVICE KIT MISCELLANEOUS
Qty: 1 | Refills: 0 | Status: ACTIVE | COMMUNITY
Start: 2025-09-18 | End: 1900-01-01

## 2025-09-18 RX ORDER — GLUCAGON INJECTION, SOLUTION 1 MG/.2ML
1 INJECTION, SOLUTION SUBCUTANEOUS
Qty: 1 | Refills: 1 | Status: ACTIVE | COMMUNITY
Start: 2025-09-18 | End: 1900-01-01

## 2025-09-18 RX ORDER — LANCETS 28 GAUGE
EACH MISCELLANEOUS
Qty: 3 | Refills: 1 | Status: ACTIVE | COMMUNITY
Start: 2025-09-18 | End: 1900-01-01

## 2025-09-18 RX ORDER — BLOOD SUGAR DIAGNOSTIC
STRIP MISCELLANEOUS 3 TIMES DAILY
Qty: 300 | Refills: 1 | Status: ACTIVE | COMMUNITY
Start: 2025-09-18 | End: 1900-01-01

## 2025-09-18 RX ORDER — URINE ACETONE TEST STRIPS
STRIP MISCELLANEOUS
Qty: 1 | Refills: 0 | Status: ACTIVE | COMMUNITY
Start: 2025-09-18 | End: 1900-01-01

## (undated) DEVICE — SOL IRR POUR NS 0.9% 1500ML

## (undated) DEVICE — ELCTR GROUNDING PAD ADULT COVIDIEN

## (undated) DEVICE — DRSG CURITY GAUZE SPONGE 4 X 4" 12-PLY

## (undated) DEVICE — DRSG KLING 6"

## (undated) DEVICE — DRSG TEGADERM 4X4.75"

## (undated) DEVICE — LAP PAD W RING 18 X 18"

## (undated) DEVICE — SUT NYLON 3-0 18" PS-2

## (undated) DEVICE — SUT SILK 2-0 12-18"

## (undated) DEVICE — SUT PROLENE 7-0 24" BV-1

## (undated) DEVICE — SYR LUER LOK 5CC

## (undated) DEVICE — SUT SILK 4-0 30" TIES

## (undated) DEVICE — VENODYNE/SCD SLEEVE CALF MEDIUM

## (undated) DEVICE — FOR-ESU VALLEYLAB T7E14999DX: Type: DURABLE MEDICAL EQUIPMENT

## (undated) DEVICE — SUT PROLENE 6-0 18" BV-1

## (undated) DEVICE — SUT VICRYL PLUS 2-0 27" SH

## (undated) DEVICE — PACK AV FISTULA

## (undated) DEVICE — SUT SILK 3-0 18" TIES